# Patient Record
Sex: FEMALE | Race: WHITE | Employment: OTHER | URBAN - METROPOLITAN AREA
[De-identification: names, ages, dates, MRNs, and addresses within clinical notes are randomized per-mention and may not be internally consistent; named-entity substitution may affect disease eponyms.]

---

## 2016-05-16 LAB
HEMOCCULT STL QL: NEGATIVE
HEMOCCULT STL QL: NORMAL
HEMOCCULT STL QL: NORMAL

## 2017-06-23 ENCOUNTER — TELEPHONE (OUTPATIENT)
Dept: FAMILY MEDICINE CLINIC | Age: 62
End: 2017-06-23

## 2017-06-23 DIAGNOSIS — E78.00 HYPERCHOLESTEREMIA: Primary | ICD-10-CM

## 2017-06-23 DIAGNOSIS — E55.9 VITAMIN D DEFICIENCY: ICD-10-CM

## 2017-06-23 DIAGNOSIS — E87.6 LOW SERUM POTASSIUM: ICD-10-CM

## 2017-07-11 LAB
A/G RATIO: 1.7 (CALC) (ref 1–2.5)
ALBUMIN SERPL-MCNC: 4.4 G/DL
ALBUMIN SERPL-MCNC: 4.4 G/DL (ref 3.6–5.1)
ALP BLD-CCNC: 84 U/L
ALP BLD-CCNC: 84 U/L (ref 33–130)
ALT SERPL-CCNC: 13 U/L
ALT SERPL-CCNC: 13 U/L (ref 6–29)
AST SERPL-CCNC: 16 U/L
AST SERPL-CCNC: 16 U/L (ref 10–35)
BILIRUB SERPL-MCNC: 0.7 MG/DL (ref 0.1–1.4)
BILIRUB SERPL-MCNC: 0.7 MG/DL (ref 0.2–1.2)
BUN / CREAT RATIO: NORMAL (CALC) (ref 6–22)
BUN BLDV-MCNC: 15 MG/DL
BUN BLDV-MCNC: 15 MG/DL (ref 7–25)
CALCIUM SERPL-MCNC: 9.4 MG/DL
CALCIUM SERPL-MCNC: 9.4 MG/DL (ref 8.6–10.4)
CHLORIDE BLD-SCNC: 105 MMOL/L
CHLORIDE BLD-SCNC: 105 MMOL/L (ref 98–110)
CHOLESTEROL, TOTAL: 248 MG/DL
CHOLESTEROL, TOTAL: 248 MG/DL (ref 125–200)
CHOLESTEROL/HDL RATIO: 4.7
CHOLESTEROL/HDL RATIO: 4.7 (CALC)
CHOLESTEROL: 195 MG/DL (CALC)
CO2: 26 MMOL/L
CO2: 26 MMOL/L (ref 20–31)
COPY TO: NORMAL
CREAT SERPL-MCNC: 0.71 MG/DL
CREAT SERPL-MCNC: 0.71 MG/DL (ref 0.5–0.99)
GFR AFRICAN AMERICAN: 107 ML/MIN/1.73M2
GFR CALCULATED: NORMAL
GFR SERPL CREATININE-BSD FRML MDRD: 93 ML/MIN/1.73M2
GLOBULIN: 2.6 G/DL (CALC) (ref 1.9–3.7)
GLUCOSE BLD-MCNC: 81 MG/DL
GLUCOSE BLD-MCNC: 81 MG/DL (ref 65–99)
HDLC SERPL-MCNC: 53 MG/DL
HDLC SERPL-MCNC: 53 MG/DL (ref 35–70)
LDL CHOLESTEROL CALCULATED: 159 MG/DL (CALC)
LDL CHOLESTEROL CALCULATED: 159 MG/DL (ref 0–160)
POTASSIUM SERPL-SCNC: 3.9 MMOL/L
POTASSIUM SERPL-SCNC: 3.9 MMOL/L (ref 3.5–5.3)
SODIUM BLD-SCNC: 140 MMOL/L
SODIUM BLD-SCNC: 140 MMOL/L (ref 135–146)
TOTAL PROTEIN: 7
TOTAL PROTEIN: 7 G/DL (ref 6.1–8.1)
TRIGL SERPL-MCNC: 178 MG/DL
TRIGL SERPL-MCNC: 178 MG/DL
VITAMIN D 25-HYDROXY: 54
VITAMIN D 25-HYDROXY: 54 NG/ML (ref 30–100)
VITAMIN D2, 25 HYDROXY: NORMAL
VITAMIN D3,25 HYDROXY: NORMAL
VLDLC SERPL CALC-MCNC: ABNORMAL MG/DL

## 2017-07-19 ENCOUNTER — OFFICE VISIT (OUTPATIENT)
Dept: FAMILY MEDICINE CLINIC | Age: 62
End: 2017-07-19

## 2017-07-19 VITALS
HEIGHT: 63 IN | BODY MASS INDEX: 26.97 KG/M2 | DIASTOLIC BLOOD PRESSURE: 60 MMHG | RESPIRATION RATE: 16 BRPM | HEART RATE: 78 BPM | SYSTOLIC BLOOD PRESSURE: 110 MMHG | OXYGEN SATURATION: 98 % | WEIGHT: 152.2 LBS

## 2017-07-19 DIAGNOSIS — E78.5 HYPERLIPIDEMIA, UNSPECIFIED HYPERLIPIDEMIA TYPE: ICD-10-CM

## 2017-07-19 DIAGNOSIS — F41.9 ANXIETY: ICD-10-CM

## 2017-07-19 DIAGNOSIS — Z00.00 ANNUAL PHYSICAL EXAM: Primary | ICD-10-CM

## 2017-07-19 DIAGNOSIS — Z12.39 BREAST CANCER SCREENING: ICD-10-CM

## 2017-07-19 PROCEDURE — 99396 PREV VISIT EST AGE 40-64: CPT | Performed by: NURSE PRACTITIONER

## 2017-07-19 RX ORDER — LORAZEPAM 0.5 MG/1
0.5 TABLET ORAL 2 TIMES DAILY PRN
Qty: 60 TABLET | Refills: 0 | Status: SHIPPED | OUTPATIENT
Start: 2017-07-19 | End: 2017-08-18

## 2017-07-19 RX ORDER — UBIDECARENONE 75 MG
50 CAPSULE ORAL DAILY
COMMUNITY
End: 2018-11-29

## 2017-07-19 RX ORDER — ATORVASTATIN CALCIUM 10 MG/1
10 TABLET, FILM COATED ORAL DAILY
Qty: 30 TABLET | Refills: 2 | Status: SHIPPED | OUTPATIENT
Start: 2017-07-19 | End: 2017-10-10 | Stop reason: SDUPTHER

## 2017-07-19 ASSESSMENT — PATIENT HEALTH QUESTIONNAIRE - PHQ9
SUM OF ALL RESPONSES TO PHQ QUESTIONS 1-9: 0
1. LITTLE INTEREST OR PLEASURE IN DOING THINGS: 0
2. FEELING DOWN, DEPRESSED OR HOPELESS: 0
SUM OF ALL RESPONSES TO PHQ9 QUESTIONS 1 & 2: 0

## 2017-10-10 ENCOUNTER — PATIENT MESSAGE (OUTPATIENT)
Dept: FAMILY MEDICINE CLINIC | Age: 62
End: 2017-10-10

## 2017-10-10 DIAGNOSIS — E78.5 HYPERLIPIDEMIA, UNSPECIFIED HYPERLIPIDEMIA TYPE: ICD-10-CM

## 2017-10-10 DIAGNOSIS — Z79.899 LONG TERM USE OF DRUG: Primary | ICD-10-CM

## 2017-10-10 RX ORDER — ATORVASTATIN CALCIUM 10 MG/1
10 TABLET, FILM COATED ORAL DAILY
Qty: 30 TABLET | Refills: 2 | Status: SHIPPED | OUTPATIENT
Start: 2017-10-10 | End: 2018-01-19 | Stop reason: SDUPTHER

## 2017-10-10 RX ORDER — SCOLOPAMINE TRANSDERMAL SYSTEM 1 MG/1
1 PATCH, EXTENDED RELEASE TRANSDERMAL
Qty: 4 PATCH | Refills: 0 | Status: SHIPPED | OUTPATIENT
Start: 2017-10-10 | End: 2018-09-14 | Stop reason: ALTCHOICE

## 2017-11-20 ENCOUNTER — OFFICE VISIT (OUTPATIENT)
Dept: FAMILY MEDICINE CLINIC | Age: 62
End: 2017-11-20

## 2017-11-20 VITALS
WEIGHT: 150 LBS | RESPIRATION RATE: 16 BRPM | DIASTOLIC BLOOD PRESSURE: 68 MMHG | HEART RATE: 99 BPM | TEMPERATURE: 101 F | HEIGHT: 63 IN | BODY MASS INDEX: 26.58 KG/M2 | SYSTOLIC BLOOD PRESSURE: 112 MMHG | OXYGEN SATURATION: 97 %

## 2017-11-20 DIAGNOSIS — J06.9 VIRAL URI: Primary | ICD-10-CM

## 2017-11-20 DIAGNOSIS — R52 BODY ACHES: ICD-10-CM

## 2017-11-20 DIAGNOSIS — R50.9 TEMPERATURE ELEVATED: ICD-10-CM

## 2017-11-20 LAB
INFLUENZA A ANTIBODY: NEGATIVE
INFLUENZA B ANTIBODY: NEGATIVE

## 2017-11-20 PROCEDURE — 87804 INFLUENZA ASSAY W/OPTIC: CPT | Performed by: NURSE PRACTITIONER

## 2017-11-20 PROCEDURE — 99214 OFFICE O/P EST MOD 30 MIN: CPT | Performed by: NURSE PRACTITIONER

## 2017-11-20 RX ORDER — AMOXICILLIN 500 MG/1
1 TABLET, FILM COATED ORAL 3 TIMES DAILY
Qty: 30 TABLET | Refills: 0 | Status: SHIPPED | OUTPATIENT
Start: 2017-11-20 | End: 2017-11-30

## 2017-11-20 RX ORDER — AMOXICILLIN 250 MG/1
500 CAPSULE ORAL 2 TIMES DAILY
Qty: 20 CAPSULE | Refills: 0 | Status: SHIPPED | OUTPATIENT
Start: 2017-11-20 | End: 2017-11-30

## 2017-11-20 ASSESSMENT — ENCOUNTER SYMPTOMS
CHEST TIGHTNESS: 0
COUGH: 1
WHEEZING: 0

## 2017-11-20 NOTE — PATIENT INSTRUCTIONS
avoid nausea. Avoid Ibuprofen if you have high blood pressure, CHF, or kidney problems. 6.Gargle: Gargle in the back of the throat with the head tilted back and to the sides with a strong mouthwash ( Listerine or Scope) after meals and at bedtime at least 4 -5 times a day. This helps kill bacteria and viruses in the back of the throat and will shorten the duration and decrease the severity of your symptoms: sore throat, cough, ear popping,/ear pain, and possibly dizziness. 7. Smoking: Avoid smoking or exposure to second hand smoke. 8. Zinc: Zinc lozenges such as Cold Carroll, or Basic will help shorten the duration and lessen symptoms such as sore throat, cough, nasal congestion, runny nose, and post nasal drip. Use 1 lozenge every 2-4 hours ( after meals if stomach is sensitive). Children can use 10-15 mg. Or less 3-4 times a day or Zinc lollypops. In pregnancy limit to 50-60 mg. A day for 7 days as prenatals have Zinc also. With diarrhea use zinc pills 50 mg 1/2 to 1 pill 2x/day. 9. Vitamins: Vitamin C 500 mg. With breakfast and dinner. Children and pregnant women should drink citrus juices. This speeds healing and strengthens immune system. 10. Chest Symptoms: Vicks Vapor rub to the chest at bedtime. 11. Decongestants: Avoid all decongestants and antihistamine cold preparations in children. Try all of the above starting with day 1 of symptoms. If Strep throat symptoms appear call to be seen in the office as soon as possible and don't gargle on that day. Newborns, infants, or anyone with earaches or influenza may need to be seen quickly. Adults with fevers over 103 degrees or shortness of breath should call the office immediately. - doxycycline (ADOXA) 100 MG tablet; Take 1 tablet by mouth 2 times daily for 7 days. Use if not getting better. Tobacco abuse   Cut back as much as you can.

## 2017-11-20 NOTE — PROGRESS NOTES
Take 1 tablet by mouth 2 x        daily for 10 days- pt instructed to wait 3 days until taking medication - if symptoms persist/worsen    Body aches  Temperature elevated  -     POCT Influenza A/B- Negative

## 2017-12-19 ENCOUNTER — PATIENT MESSAGE (OUTPATIENT)
Dept: FAMILY MEDICINE CLINIC | Age: 62
End: 2017-12-19

## 2017-12-20 NOTE — TELEPHONE ENCOUNTER
From: Markel Cobian  To: Ciarra Lewis MD  Sent: 12/19/2017 10:10 PM EST  Subject: Non-Urgent Medical Question    Please update my records. I did already receive a flu shot this year through the [facility]. I had a mammogram this year and the results were sent to my [facility]. I get mammograms on a yearly basis. And have been tested before giving a blood donation. I do not wish to make appointments for these items.     Thank you

## 2018-01-19 DIAGNOSIS — E78.5 HYPERLIPIDEMIA, UNSPECIFIED HYPERLIPIDEMIA TYPE: ICD-10-CM

## 2018-01-19 RX ORDER — ATORVASTATIN CALCIUM 10 MG/1
10 TABLET, FILM COATED ORAL DAILY
Qty: 30 TABLET | Refills: 10 | Status: SHIPPED | OUTPATIENT
Start: 2018-01-19 | End: 2018-09-17 | Stop reason: SDUPTHER

## 2018-09-14 ENCOUNTER — OFFICE VISIT (OUTPATIENT)
Dept: FAMILY MEDICINE CLINIC | Age: 63
End: 2018-09-14

## 2018-09-14 VITALS
OXYGEN SATURATION: 98 % | BODY MASS INDEX: 25.2 KG/M2 | SYSTOLIC BLOOD PRESSURE: 122 MMHG | WEIGHT: 142.2 LBS | HEART RATE: 80 BPM | DIASTOLIC BLOOD PRESSURE: 82 MMHG | HEIGHT: 63 IN | RESPIRATION RATE: 18 BRPM | TEMPERATURE: 98.7 F

## 2018-09-14 DIAGNOSIS — M79.605 LEFT LEG PAIN: ICD-10-CM

## 2018-09-14 DIAGNOSIS — M25.552 HIP PAIN, ACUTE, LEFT: Primary | ICD-10-CM

## 2018-09-14 DIAGNOSIS — Z23 NEED FOR INFLUENZA VACCINATION: ICD-10-CM

## 2018-09-14 PROCEDURE — 90688 IIV4 VACCINE SPLT 0.5 ML IM: CPT | Performed by: NURSE PRACTITIONER

## 2018-09-14 PROCEDURE — 90471 IMMUNIZATION ADMIN: CPT | Performed by: NURSE PRACTITIONER

## 2018-09-14 PROCEDURE — 99213 OFFICE O/P EST LOW 20 MIN: CPT | Performed by: NURSE PRACTITIONER

## 2018-09-14 RX ORDER — METHOCARBAMOL 500 MG/1
500 TABLET, FILM COATED ORAL 4 TIMES DAILY PRN
Qty: 30 TABLET | Refills: 0 | Status: SHIPPED | OUTPATIENT
Start: 2018-09-14 | End: 2018-09-24

## 2018-09-14 ASSESSMENT — ENCOUNTER SYMPTOMS: BACK PAIN: 0

## 2018-09-14 ASSESSMENT — PATIENT HEALTH QUESTIONNAIRE - PHQ9
SUM OF ALL RESPONSES TO PHQ QUESTIONS 1-9: 0
1. LITTLE INTEREST OR PLEASURE IN DOING THINGS: 0
2. FEELING DOWN, DEPRESSED OR HOPELESS: 0
SUM OF ALL RESPONSES TO PHQ QUESTIONS 1-9: 0
SUM OF ALL RESPONSES TO PHQ9 QUESTIONS 1 & 2: 0

## 2018-09-14 NOTE — PATIENT INSTRUCTIONS
shortness of breath, or you cough up blood.     · Your leg is cool or pale or changes color.    Call your doctor now or seek immediate medical care if:    · You have increasing or severe pain.     · Your leg suddenly feels weak and you cannot move it.     · You have signs of a blood clot, such as:  ¨ Pain in your calf, back of the knee, thigh, or groin. ¨ Redness and swelling in your leg or groin.     · You have signs of infection, such as:  ¨ Increased pain, swelling, warmth, or redness. ¨ Red streaks leading from the sore area. ¨ Pus draining from a place on your leg. ¨ A fever.     · You cannot bear weight on your leg.    Watch closely for changes in your health, and be sure to contact your doctor if:    · You do not get better as expected. Where can you learn more? Go to https://[email or web address].SoleTrader.com. org and sign in to your [employer] account. Enter W581 in the [employer] box to learn more about \"Leg Pain: Care Instructions. \"     If you do not have an account, please click on the \"Sign Up Now\" link. Current as of: November 20, 2017  Content Version: 11.7  © 0217-2107 [employer], [employer]. Care instructions adapted under license by [facility] ([facility]). If you have questions about a medical condition or this instruction, always ask your healthcare professional. [place] [address] any warranty or liability for your use of this information.

## 2018-09-17 DIAGNOSIS — E78.5 HYPERLIPIDEMIA, UNSPECIFIED HYPERLIPIDEMIA TYPE: ICD-10-CM

## 2018-09-18 RX ORDER — ATORVASTATIN CALCIUM 10 MG/1
TABLET, FILM COATED ORAL
Qty: 90 TABLET | Refills: 0 | Status: SHIPPED | OUTPATIENT
Start: 2018-09-18 | End: 2018-12-24 | Stop reason: SDUPTHER

## 2018-11-29 ENCOUNTER — OFFICE VISIT (OUTPATIENT)
Dept: FAMILY MEDICINE CLINIC | Age: 63
End: 2018-11-29
Payer: COMMERCIAL

## 2018-11-29 VITALS
HEIGHT: 63 IN | DIASTOLIC BLOOD PRESSURE: 90 MMHG | BODY MASS INDEX: 25.69 KG/M2 | HEART RATE: 80 BPM | RESPIRATION RATE: 16 BRPM | WEIGHT: 145 LBS | SYSTOLIC BLOOD PRESSURE: 138 MMHG

## 2018-11-29 DIAGNOSIS — K21.9 GASTROESOPHAGEAL REFLUX DISEASE, ESOPHAGITIS PRESENCE NOT SPECIFIED: Primary | ICD-10-CM

## 2018-11-29 DIAGNOSIS — Z00.00 WELLNESS EXAMINATION: ICD-10-CM

## 2018-11-29 DIAGNOSIS — R03.0 ELEVATED BP WITHOUT DIAGNOSIS OF HYPERTENSION: ICD-10-CM

## 2018-11-29 PROCEDURE — 99214 OFFICE O/P EST MOD 30 MIN: CPT | Performed by: REGISTERED NURSE

## 2018-11-29 ASSESSMENT — ENCOUNTER SYMPTOMS
SHORTNESS OF BREATH: 0
DIARRHEA: 0
CONSTIPATION: 0
BLOOD IN STOOL: 0
COUGH: 0
VOMITING: 0
RECTAL PAIN: 0
ABDOMINAL PAIN: 1
ANAL BLEEDING: 0
ABDOMINAL DISTENTION: 0
NAUSEA: 0
WHEEZING: 0
CHEST TIGHTNESS: 0

## 2018-11-29 NOTE — PATIENT INSTRUCTIONS
systolic pressure. It shows how hard the blood pushes when your heart is pumping. The second number is the diastolic pressure. It shows how hard the blood pushes between heartbeats, when your heart is relaxed and filling with blood. An ideal blood pressure in adults is less than 120/80 (say \"120 over 80\"). High blood pressure is 140/90 or higher. You have high blood pressure if your top number is 140 or higher or your bottom number is 90 or higher, or both. The main test for high blood pressure is simple, fast, and painless. To diagnose high blood pressure, your doctor will test your blood pressure at different times. After testing your blood pressure, your doctor may ask you to test it again when you are home. If you are diagnosed with high blood pressure, you can work with your doctor to make a long-term plan to manage it. Follow-up care is a key part of your treatment and safety. Be sure to make and go to all appointments, and call your doctor if you are having problems. It's also a good idea to know your test results and keep a list of the medicines you take. How can you care for yourself at home? · Do not smoke. Smoking increases your risk for heart attack and stroke. If you need help quitting, talk to your doctor about stop-smoking programs and medicines. These can increase your chances of quitting for good. · Stay at a healthy weight. · Try to limit how much sodium you eat to less than 2,300 milligrams (mg) a day. Your doctor may ask you to try to eat less than 1,500 mg a day. · Be physically active. Get at least 30 minutes of exercise on most days of the week. Walking is a good choice. You also may want to do other activities, such as running, swimming, cycling, or playing tennis or team sports. · Avoid or limit alcohol. Talk to your doctor about whether you can drink any alcohol. · Eat plenty of fruits, vegetables, and low-fat dairy products. Eat less saturated and total fats.   · Learn how to check your blood pressure at home. When should you call for help? Call your doctor now or seek immediate medical care if:    · Your blood pressure is much higher than normal (such as 180/110 or higher).     · You think high blood pressure is causing symptoms such as:  ¨ Severe headache. ¨ Blurry vision.    Watch closely for changes in your health, and be sure to contact your doctor if:    · You do not get better as expected. Where can you learn more? Go to https://[employer]. org and sign in to your [employer] account. Enter I332 in the [employer] box to learn more about \"Elevated Blood Pressure: Care Instructions. \"     If you do not have an account, please click on the \"Sign Up Now\" link. Current as of: May 10, 2017  Content Version: 11.6  © 5424-8348 [employer]. Care instructions adapted under license by [facility] ([facility]). If you have questions about a medical condition or this instruction, always ask your healthcare professional. [address]  any warranty or liability for your use of this information.

## 2018-11-29 NOTE — PROGRESS NOTES
Sister        Current Outpatient Prescriptions   Medication Sig Dispense Refill    Diclofenac Sodium  MG TB24 TAKE 1 TABLET BY MOUTH ONE TIME A DAY  0    atorvastatin (LIPITOR) 10 MG tablet TAKE 1 TABLET BY MOUTH ONE TIME A DAY  90 tablet 0    Multiple Vitamins-Minerals (THERAPEUTIC MULTIVITAMIN-MINERALS) tablet Take 1 tablet by mouth daily      Ascorbic Acid (VITAMIN C) 500 MG tablet Take 500 mg by mouth daily      Cholecalciferol (VITAMIN D3) 3000 UNITS TABS Take by mouth      Omega-3 Fatty Acids (FISH OIL PO) Take by mouth      FIBER PO Take by mouth      Calcium-Phosphorus-Vitamin D (CALCIUM/VITAMIN D3/ADULT GUMMY PO) Take 1 each by mouth daily      vitamin B-12 (CYANOCOBALAMIN) 100 MCG tablet Take 50 mcg by mouth daily       No current facility-administered medications for this visit. Allergies   Allergen Reactions    Ancef [Cefazolin]     Codeine     Cyclobenzaprine     Erythromycin     Sulfa Antibiotics     Sulfamethazine        Review of Systems   Constitutional: Negative for chills, diaphoresis, fatigue and fever. Respiratory: Negative for cough, chest tightness, shortness of breath and wheezing. Cardiovascular: Negative for chest pain and palpitations. Gastrointestinal: Positive for abdominal pain. Negative for abdominal distention, anal bleeding, blood in stool, constipation, diarrhea, nausea, rectal pain and vomiting. Epigastric pain and belching   Neurological: Negative for dizziness, weakness, light-headedness, numbness and headaches. All other systems reviewed and are negative. Vitals:  BP (!) 150/90 (Site: Right Upper Arm, Position: Sitting, Cuff Size: Medium Adult)   Pulse 80   Resp 16   Ht 5' 3\" (1.6 m)   Wt 145 lb (65.8 kg)   LMP  (Exact Date)   Breastfeeding? No   BMI 25.69 kg/m²     Physical Exam   Constitutional: She is oriented to person, place, and time. She appears well-developed and well-nourished.    Cardiovascular: Normal rate,

## 2018-12-23 DIAGNOSIS — E78.5 HYPERLIPIDEMIA, UNSPECIFIED HYPERLIPIDEMIA TYPE: ICD-10-CM

## 2018-12-24 RX ORDER — ATORVASTATIN CALCIUM 10 MG/1
TABLET, FILM COATED ORAL
Qty: 30 TABLET | Refills: 9 | Status: SHIPPED | OUTPATIENT
Start: 2018-12-24 | End: 2019-02-20 | Stop reason: SDUPTHER

## 2018-12-27 LAB
ALBUMIN SERPL-MCNC: 4 G/DL
ALP BLD-CCNC: 81 U/L
ALT SERPL-CCNC: 29 U/L
ANION GAP SERPL CALCULATED.3IONS-SCNC: NORMAL MMOL/L
AST SERPL-CCNC: 23 U/L
BILIRUB SERPL-MCNC: 0.6 MG/DL (ref 0.1–1.4)
BUN BLDV-MCNC: 15 MG/DL
CALCIUM SERPL-MCNC: 9.1 MG/DL
CHLORIDE BLD-SCNC: 108 MMOL/L
CHOLESTEROL, TOTAL: 174 MG/DL
CHOLESTEROL/HDL RATIO: NORMAL
CO2: 27 MMOL/L
CREAT SERPL-MCNC: 0.76 MG/DL
GFR CALCULATED: NORMAL
GLUCOSE BLD-MCNC: 89 MG/DL
HDLC SERPL-MCNC: 53 MG/DL (ref 35–70)
LDL CHOLESTEROL CALCULATED: 99 MG/DL (ref 0–160)
POTASSIUM SERPL-SCNC: 3.9 MMOL/L
SODIUM BLD-SCNC: 141 MMOL/L
TOTAL PROTEIN: 6.7
TRIGL SERPL-MCNC: 123 MG/DL
VLDLC SERPL CALC-MCNC: NORMAL MG/DL

## 2019-02-20 ENCOUNTER — OFFICE VISIT (OUTPATIENT)
Dept: FAMILY MEDICINE CLINIC | Age: 64
End: 2019-02-20
Payer: COMMERCIAL

## 2019-02-20 VITALS
TEMPERATURE: 97.8 F | OXYGEN SATURATION: 97 % | SYSTOLIC BLOOD PRESSURE: 116 MMHG | HEIGHT: 63 IN | WEIGHT: 147.8 LBS | HEART RATE: 77 BPM | DIASTOLIC BLOOD PRESSURE: 72 MMHG | BODY MASS INDEX: 26.19 KG/M2

## 2019-02-20 DIAGNOSIS — Z11.4 ENCOUNTER FOR SCREENING FOR HIV: ICD-10-CM

## 2019-02-20 DIAGNOSIS — E78.5 HYPERLIPIDEMIA, UNSPECIFIED HYPERLIPIDEMIA TYPE: ICD-10-CM

## 2019-02-20 DIAGNOSIS — Z11.59 ENCOUNTER FOR HEPATITIS C SCREENING TEST FOR LOW RISK PATIENT: ICD-10-CM

## 2019-02-20 DIAGNOSIS — Z00.00 ANNUAL PHYSICAL EXAM: ICD-10-CM

## 2019-02-20 PROCEDURE — 99396 PREV VISIT EST AGE 40-64: CPT | Performed by: NURSE PRACTITIONER

## 2019-02-20 RX ORDER — CLOTRIMAZOLE 1 %
CREAM (GRAM) TOPICAL 2 TIMES DAILY
COMMUNITY
End: 2019-08-26

## 2019-02-20 RX ORDER — DESONIDE 0.5 MG/G
CREAM TOPICAL
Refills: 2 | COMMUNITY
Start: 2019-02-12 | End: 2019-08-26

## 2019-02-20 RX ORDER — ATORVASTATIN CALCIUM 10 MG/1
TABLET, FILM COATED ORAL
Qty: 30 TABLET | Refills: 11 | Status: SHIPPED | OUTPATIENT
Start: 2019-02-20 | End: 2019-08-26 | Stop reason: SDUPTHER

## 2019-02-20 ASSESSMENT — PATIENT HEALTH QUESTIONNAIRE - PHQ9
1. LITTLE INTEREST OR PLEASURE IN DOING THINGS: 0
SUM OF ALL RESPONSES TO PHQ QUESTIONS 1-9: 0
SUM OF ALL RESPONSES TO PHQ QUESTIONS 1-9: 0
SUM OF ALL RESPONSES TO PHQ9 QUESTIONS 1 & 2: 0
2. FEELING DOWN, DEPRESSED OR HOPELESS: 0

## 2019-03-08 ENCOUNTER — PATIENT MESSAGE (OUTPATIENT)
Dept: FAMILY MEDICINE CLINIC | Age: 64
End: 2019-03-08

## 2019-03-08 ENCOUNTER — E-VISIT (OUTPATIENT)
Dept: FAMILY MEDICINE CLINIC | Age: 64
End: 2019-03-08
Payer: COMMERCIAL

## 2019-03-08 DIAGNOSIS — J01.90 ACUTE SINUSITIS, RECURRENCE NOT SPECIFIED, UNSPECIFIED LOCATION: Primary | ICD-10-CM

## 2019-03-08 PROCEDURE — 99444 PR PHYSICIAN ONLINE EVALUATION & MANAGEMENT SERVICE: CPT | Performed by: FAMILY MEDICINE

## 2019-03-08 RX ORDER — AMOXICILLIN 500 MG/1
500 CAPSULE ORAL 3 TIMES DAILY
Qty: 21 CAPSULE | Refills: 0 | Status: SHIPPED | OUTPATIENT
Start: 2019-03-08 | End: 2019-03-15

## 2019-03-08 ASSESSMENT — LIFESTYLE VARIABLES: SMOKING_STATUS: NO, I'VE NEVER SMOKED

## 2019-08-26 ENCOUNTER — OFFICE VISIT (OUTPATIENT)
Dept: FAMILY MEDICINE CLINIC | Age: 64
End: 2019-08-26
Payer: COMMERCIAL

## 2019-08-26 VITALS
WEIGHT: 142 LBS | SYSTOLIC BLOOD PRESSURE: 116 MMHG | HEART RATE: 69 BPM | BODY MASS INDEX: 25.16 KG/M2 | HEIGHT: 63 IN | OXYGEN SATURATION: 98 % | DIASTOLIC BLOOD PRESSURE: 80 MMHG | RESPIRATION RATE: 16 BRPM

## 2019-08-26 DIAGNOSIS — R10.11 RUQ PAIN: Primary | ICD-10-CM

## 2019-08-26 DIAGNOSIS — E78.5 HYPERLIPIDEMIA, UNSPECIFIED HYPERLIPIDEMIA TYPE: ICD-10-CM

## 2019-08-26 LAB
ABSOLUTE BANDS #: NORMAL
ABSOLUTE EOS #: 57
ABSOLUTE IMMATURE GRANULOCYTE: NORMAL
ABSOLUTE LYMPH #: 1783
ABSOLUTE MONO #: 498
ALBUMIN SERPL-MCNC: 4.4 G/DL
ALP BLD-CCNC: 86 U/L
ALT SERPL-CCNC: 16 U/L
AMYLASE: 58 UNITS/L
ANION GAP SERPL CALCULATED.3IONS-SCNC: NORMAL MMOL/L
AST SERPL-CCNC: 16 U/L
BANDS: NORMAL
BASOPHILS # BLD: 0.6 10*3/UL
BASOPHILS ABSOLUTE: 38 /ΜL
BILIRUB SERPL-MCNC: 0.5 MG/DL (ref 0.1–1.4)
BILIRUBIN, POC: ABNORMAL
BLOOD URINE, POC: ABNORMAL
BUN BLDV-MCNC: 10 MG/DL
C-REACTIVE PROTEIN: 4.2
CALCIUM SERPL-MCNC: 9.2 MG/DL
CHLORIDE BLD-SCNC: 106 MMOL/L
CLARITY, POC: ABNORMAL
CO2: 27 MMOL/L
COLOR, POC: ABNORMAL
CREAT SERPL-MCNC: 0.68 MG/DL
EOSINOPHILS RELATIVE PERCENT: 0.9
GFR CALCULATED: 94
GLUCOSE BLD-MCNC: 87 MG/DL
GLUCOSE URINE, POC: ABNORMAL
HCT VFR BLD CALC: 41.5 % (ref 36–46)
HEMOGLOBIN: 14.2 G/DL (ref 12–16)
IMMATURE GRANULOCYTES: NORMAL
KETONES, POC: ABNORMAL
LEUKOCYTE EST, POC: ABNORMAL
LIPASE: 20 UNITS/L
LYMPHOCYTES # BLD: 28.3 10*3/UL
MCH RBC QN AUTO: 31.2 PG
MCHC RBC AUTO-ENTMCNC: 34.2 G/DL
MCV RBC AUTO: 91.2 FL
MONOCYTES # BLD: 7.9 10*3/UL
MORPHOLOGY: NORMAL
NITRITE, POC: ABNORMAL
NRBC AUTOMATED: NORMAL
PDW BLD-RTO: 12.8 %
PH, POC: 5.5
PLATELET # BLD: 211 K/ΜL
PLATELET ESTIMATE: NORMAL
PMV BLD AUTO: 11.5 FL
POTASSIUM SERPL-SCNC: 4 MMOL/L
PROTEIN, POC: ABNORMAL
RBC # BLD: 4.55 10^6/ΜL
RBC # BLD: NORMAL 10*6/UL
SEDIMENTATION RATE, ERYTHROCYTE: 2
SEG NEUTROPHILS: 62.3
SEGMENTED NEUTROPHILS ABSOLUTE COUNT: 3925
SODIUM BLD-SCNC: 142 MMOL/L
SPECIFIC GRAVITY, POC: 1.03
TOTAL PROTEIN: 6.8
UROBILINOGEN, POC: 0.2
WBC # BLD: 6.3 10^3/ML
WBC # BLD: NORMAL 10*3/UL

## 2019-08-26 PROCEDURE — 99214 OFFICE O/P EST MOD 30 MIN: CPT | Performed by: REGISTERED NURSE

## 2019-08-26 PROCEDURE — 81002 URINALYSIS NONAUTO W/O SCOPE: CPT | Performed by: REGISTERED NURSE

## 2019-08-26 RX ORDER — ATORVASTATIN CALCIUM 10 MG/1
TABLET, FILM COATED ORAL
Qty: 30 TABLET | Refills: 5 | Status: SHIPPED | OUTPATIENT
Start: 2019-08-26 | End: 2020-03-09 | Stop reason: SDUPTHER

## 2019-08-26 ASSESSMENT — ENCOUNTER SYMPTOMS
CONSTIPATION: 0
ABDOMINAL PAIN: 1
CHEST TIGHTNESS: 0
ABDOMINAL DISTENTION: 0
COUGH: 0
VOMITING: 0
RECTAL PAIN: 0
ANAL BLEEDING: 0
SHORTNESS OF BREATH: 0
WHEEZING: 0
NAUSEA: 1
DIARRHEA: 0
BLOOD IN STOOL: 0

## 2019-08-26 NOTE — PATIENT INSTRUCTIONS
Patient Education        Abdominal Pain: Care Instructions  Your Care Instructions    Abdominal pain has many possible causes. Some aren't serious and get better on their own in a few days. Others need more testing and treatment. If your pain continues or gets worse, you need to be rechecked and may need more tests to find out what is wrong. You may need surgery to correct the problem. Don't ignore new symptoms, such as fever, nausea and vomiting, urination problems, pain that gets worse, and dizziness. These may be signs of a more serious problem. Your doctor may have recommended a follow-up visit in the next 8 to 12 hours. If you are not getting better, you may need more tests or treatment. The doctor has checked you carefully, but problems can develop later. If you notice any problems or new symptoms, get medical treatment right away. Follow-up care is a key part of your treatment and safety. Be sure to make and go to all appointments, and call your doctor if you are having problems. It's also a good idea to know your test results and keep a list of the medicines you take. How can you care for yourself at home? · Rest until you feel better. · To prevent dehydration, drink plenty of fluids, enough so that your urine is light yellow or clear like water. Choose water and other caffeine-free clear liquids until you feel better. If you have kidney, heart, or liver disease and have to limit fluids, talk with your doctor before you increase the amount of fluids you drink. · If your stomach is upset, eat mild foods, such as rice, dry toast or crackers, bananas, and applesauce. Try eating several small meals instead of two or three large ones. · Wait until 48 hours after all symptoms have gone away before you have spicy foods, alcohol, and drinks that contain caffeine. · Do not eat foods that are high in fat. · Avoid anti-inflammatory medicines such as aspirin, ibuprofen (Advil, Motrin), and naproxen (Aleve). 1975-8869 Healthwise, Incorporated. Care instructions adapted under license by [facility] ([facility]). If you have questions about a medical condition or this instruction, always ask your healthcare professional. [address] any warranty or liability for your use of this information. Patient Education        Biliary Colic: Care Instructions  Your Care Instructions    Biliary (say \"BILL-ee-air-ee\") colic is belly pain caused by gallbladder problems. It is usually caused by a gallstone moving through or blocking the common bile duct or cystic duct. Gallstones are stones that form in the gallbladder. They are made of cholesterol and other substances. The gallbladder is a small sac located just under the liver. It stores bile released by the liver. Bile helps you digest fats. Gallstones also can form in the common bile duct or cystic duct. These ducts carry bile from the gallbladder and the liver to the small intestine. Gallstones may be as small as a grain of sand or as large as a golf ball. Gallstones that cause severe symptoms usually are treated with surgery to remove the gallbladder. If the first attack of biliary colic is mild, it is often safe to wait until you have had another attack before you think about having surgery. The doctor has checked you carefully, but problems can develop later. If you notice any problems or new symptoms, get medical treatment right away. Follow-up care is a key part of your treatment and safety. Be sure to make and go to all appointments, and call your doctor if you are having problems. It's also a good idea to know your test results and keep a list of the medicines you take. How can you care for yourself at home? · Take pain medicines exactly as directed. ? If the doctor gave you a prescription medicine for pain, take it as prescribed. ? If you are not taking a prescription pain medicine, ask your doctor if you can take an over-the-counter medicine. them in butter or fat. · Drink or eat nonfat or low-fat milk, yogurt, cheese, or other milk products each day. ? Read the labels on cheeses, and choose those with less than 5 grams of fat an ounce. ? Try fat-free sour cream, cream cheese, or yogurt. ? Avoid cream soups and cream sauces on pasta. ? Eat low-fat ice cream, frozen yogurt, or sorbet. Avoid regular ice cream.  · Eat whole-grain cereals, breads, crackers, rice, or pasta. Avoid high-fat foods such as croissants, scones, biscuits, waffles, doughnuts, muffins, granola, and high-fat breads. · Flavor your foods with herbs and spices (such as basil, tarragon, or mint), fat-free sauces, or lemon juice instead of butter. You can also use butter substitutes, fat-free mayonnaise, or fat-free dressing. · Try applesauce, prune puree, or mashed bananas to replace some or all of the fat when you bake. · Limit fats and oils, such as butter, margarine, mayonnaise, and salad dressing, to no more than 1 tablespoon a meal.  · Avoid high-fat foods, such as:  ? Chocolate, whole milk, ice cream, and processed cheese. ? Fried or buttered foods. ? Sausage, salami, and leiva. ? Cinnamon rolls, cakes, pies, cookies, and other pastries. ? Prepared snack foods, such as potato chips, nut and granola bars, and mixed nuts. ? Coconut and avocado. · Learn how to read food labels for serving sizes and ingredients. Fast-food and convenience-food meals often have lots of fat. Where can you learn more? Go to https://[employer]ged. org and sign in to your [employer] account. Enter J024 in the [facility] box to learn more about \"Low-Fat Diet for Gallbladder Disease: Care Instructions. \"     If you do not have an account, please click on the \"Sign Up Now\" link. Current as of: November 7, 2018  Content Version: 12.1  © 9953-9618 Healthwise, Incorporated. Care instructions adapted under license by [facility] ([facility]).  If you have questions about a medical

## 2019-08-27 ENCOUNTER — TELEPHONE (OUTPATIENT)
Dept: FAMILY MEDICINE CLINIC | Age: 64
End: 2019-08-27

## 2019-08-27 NOTE — TELEPHONE ENCOUNTER
The only lab results we have back from them so far is the D-dimer- which was normal. Can we see if we can get the results from [facility]? Please update patient on this.

## 2019-08-28 LAB — URINE CULTURE, ROUTINE: NORMAL

## 2019-08-29 ENCOUNTER — HOSPITAL ENCOUNTER (OUTPATIENT)
Dept: ULTRASOUND IMAGING | Age: 64
Discharge: HOME | End: 2019-08-29
Payer: COMMERCIAL

## 2019-08-29 DIAGNOSIS — R10.11 RUQ PAIN: ICD-10-CM

## 2019-08-29 PROCEDURE — 76705 ECHO EXAM OF ABDOMEN: CPT

## 2019-08-30 ENCOUNTER — TELEPHONE (OUTPATIENT)
Dept: FAMILY MEDICINE CLINIC | Age: 64
End: 2019-08-30

## 2019-08-30 ENCOUNTER — PATIENT MESSAGE (OUTPATIENT)
Dept: FAMILY MEDICINE CLINIC | Age: 64
End: 2019-08-30

## 2019-08-30 DIAGNOSIS — K82.8 GALLBLADDER SLUDGE: Primary | ICD-10-CM

## 2019-11-18 DIAGNOSIS — E78.5 HYPERLIPIDEMIA, UNSPECIFIED HYPERLIPIDEMIA TYPE: Primary | ICD-10-CM

## 2019-11-18 DIAGNOSIS — Z13.1 DIABETES MELLITUS SCREENING: ICD-10-CM

## 2019-12-09 ENCOUNTER — E-VISIT (OUTPATIENT)
Dept: FAMILY MEDICINE CLINIC | Age: 64
End: 2019-12-09
Payer: COMMERCIAL

## 2019-12-09 DIAGNOSIS — R05.9 COUGH: ICD-10-CM

## 2019-12-09 DIAGNOSIS — B96.89 ACUTE BACTERIAL SINUSITIS: Primary | ICD-10-CM

## 2019-12-09 DIAGNOSIS — J01.90 ACUTE BACTERIAL SINUSITIS: Primary | ICD-10-CM

## 2019-12-09 PROCEDURE — 98969 PR NONPHYSICIAN ONLINE ASSESSMENT AND MANAGEMENT: CPT | Performed by: PHYSICIAN ASSISTANT

## 2019-12-09 RX ORDER — ONDANSETRON 4 MG/1
4 TABLET, FILM COATED ORAL 3 TIMES DAILY PRN
Qty: 30 TABLET | Refills: 0 | Status: SHIPPED | OUTPATIENT
Start: 2019-12-09 | End: 2020-04-21 | Stop reason: ALTCHOICE

## 2019-12-09 RX ORDER — PREDNISONE 20 MG/1
20 TABLET ORAL 2 TIMES DAILY
Qty: 10 TABLET | Refills: 0 | Status: SHIPPED | OUTPATIENT
Start: 2019-12-09 | End: 2019-12-14

## 2019-12-09 RX ORDER — AMOXICILLIN 500 MG/1
500 CAPSULE ORAL 3 TIMES DAILY
Qty: 30 CAPSULE | Refills: 0 | Status: SHIPPED | OUTPATIENT
Start: 2019-12-09 | End: 2019-12-19

## 2019-12-09 RX ORDER — BENZONATATE 200 MG/1
200 CAPSULE ORAL 3 TIMES DAILY PRN
Qty: 21 CAPSULE | Refills: 0 | Status: SHIPPED | OUTPATIENT
Start: 2019-12-09 | End: 2019-12-16

## 2019-12-09 ASSESSMENT — LIFESTYLE VARIABLES: SMOKING_STATUS: NO, I'VE NEVER SMOKED

## 2020-01-07 ENCOUNTER — OFFICE VISIT (OUTPATIENT)
Dept: FAMILY MEDICINE CLINIC | Age: 65
End: 2020-01-07
Payer: COMMERCIAL

## 2020-01-07 VITALS
WEIGHT: 148.4 LBS | SYSTOLIC BLOOD PRESSURE: 122 MMHG | HEART RATE: 78 BPM | BODY MASS INDEX: 26.29 KG/M2 | TEMPERATURE: 98.6 F | RESPIRATION RATE: 16 BRPM | HEIGHT: 63 IN | DIASTOLIC BLOOD PRESSURE: 76 MMHG

## 2020-01-07 PROCEDURE — 96372 THER/PROPH/DIAG INJ SC/IM: CPT | Performed by: NURSE PRACTITIONER

## 2020-01-07 PROCEDURE — 99213 OFFICE O/P EST LOW 20 MIN: CPT | Performed by: NURSE PRACTITIONER

## 2020-01-07 RX ORDER — LEVOFLOXACIN 750 MG/1
750 TABLET ORAL DAILY
Qty: 5 TABLET | Refills: 0 | Status: SHIPPED | OUTPATIENT
Start: 2020-01-07 | End: 2020-01-12

## 2020-01-07 RX ORDER — METHYLPREDNISOLONE ACETATE 80 MG/ML
80 INJECTION, SUSPENSION INTRA-ARTICULAR; INTRALESIONAL; INTRAMUSCULAR; SOFT TISSUE ONCE
Status: COMPLETED | OUTPATIENT
Start: 2020-01-07 | End: 2020-01-07

## 2020-01-07 RX ADMIN — METHYLPREDNISOLONE ACETATE 80 MG: 80 INJECTION, SUSPENSION INTRA-ARTICULAR; INTRALESIONAL; INTRAMUSCULAR; SOFT TISSUE at 13:59

## 2020-01-07 ASSESSMENT — ENCOUNTER SYMPTOMS
SINUS PRESSURE: 1
SINUS PAIN: 1

## 2020-01-07 ASSESSMENT — PATIENT HEALTH QUESTIONNAIRE - PHQ9
SUM OF ALL RESPONSES TO PHQ QUESTIONS 1-9: 0
SUM OF ALL RESPONSES TO PHQ9 QUESTIONS 1 & 2: 0
SUM OF ALL RESPONSES TO PHQ QUESTIONS 1-9: 0
2. FEELING DOWN, DEPRESSED OR HOPELESS: 0
1. LITTLE INTEREST OR PLEASURE IN DOING THINGS: 0

## 2020-01-07 NOTE — PROGRESS NOTES
SUBJECTIVE:    Patient ID: Rizwan Mario is a 60 y.o. y.o. female. HPI  Chief Complaint   Patient presents with    Sinusitis     Patient is being seen today for a sinus infection that she has had for over a month    PT C/O  SINUS CONGESTION - PRESSURE IN FORE HEAD- NO EAR PAIN OR SORE THROAT - THE PRESSURE HAS GOTTEN ANY BETTER - COUGH AND NASAL DRAINAGE OF GREEN YELLOW STUFF  AMOXICILLIN - PREDNISONE DID NOT HELP- TRIED ADVIL COLD AND SINUS   Review of Systems   HENT: Positive for sinus pressure and sinus pain. All other systems reviewed and are negative. OBJECTIVE:  Vitals:    01/07/20 1301   BP: 122/76   Pulse: 78   Resp: 16   Temp: 98.6 °F (37 °C)       Physical Exam  Constitutional:       General: She is awake. Appearance: Normal appearance. She is well-developed, well-groomed and normal weight. She is not ill-appearing, toxic-appearing or diaphoretic. HENT:      Nose:      Right Sinus: Frontal sinus tenderness present. No maxillary sinus tenderness. Left Sinus: Frontal sinus tenderness present. No maxillary sinus tenderness. Pulmonary:      Effort: Pulmonary effort is normal.      Breath sounds: Normal breath sounds and air entry. Neurological:      Mental Status: She is alert. Psychiatric:         Attention and Perception: Attention normal.         Mood and Affect: Mood normal.         Speech: Speech normal.         Behavior: Behavior normal. Behavior is cooperative. Thought Content: Thought content normal.         Cognition and Memory: Cognition normal.         Judgment: Judgment normal.         Amada was seen today for sinusitis. Diagnoses and all orders for this visit:    Protracted URI   levofloxacin (LEVAQUIN) 750 MG tablet;  Take 1 tablet by mouth daily for 5 days  INSTRUCTED TO TAKE WITH PROBIOTIC  Instructions for Respiratory Infections (SAVE THIS SHEET)   For the first 7-14 days of symptoms follow instructions below, even before being seen in the office or the sides with a strong mouthwash ( Listerine or Scope) after meals and at bedtime at least 4 -5 times a day. This helps kill bacteria and viruses in the back of the throat and will shorten the duration and decrease the severity of your symptoms: sore throat, cough, ear popping,/ear pain, and possibly dizziness. 7. Smoking: Avoid smoking or exposure to second hand smoke. 8. Zinc: Zinc lozenges such as Cold Carroll, or Basic will help shorten the duration and lessen symptoms such as sore throat, cough, nasal congestion, runny nose, and post nasal drip. Use 1 lozenge every 2-4 hours ( after meals if stomach is sensitive). Children can use 10-15 mg. Or less 3-4 times a day or Zinc lollypops. In pregnancy limit to 50-60 mg. A day for 7 days as prenatals have Zinc also. With diarrhea use zinc pills 50 mg 1/2 to 1 pill 2x/day. 9. Vitamins: Vitamin C 500 mg. With breakfast and dinner. Children and pregnant women should drink citrus juices. This speeds healing and strengthens immune system. 10. Chest Symptoms: Vicks Vapor rub to the chest at bedtime. 11. Decongestants: Avoid all decongestants and antihistamine cold preparations in children. Try all of the above starting with day 1 of symptoms. If Strep throat symptoms appear call to be seen in the office as soon as possible and don't gargle on that day. Newborns, infants, or anyone with earaches or influenza may need to be seen quickly. Adults with fevers over 103 degrees or shortness of breath should call the office immediately.

## 2020-03-04 LAB
A/G RATIO: 1.7 (CALC) (ref 1–2.5)
ALBUMIN SERPL-MCNC: 4.4 G/DL (ref 3.6–5.1)
ALP BLD-CCNC: 91 U/L (ref 37–153)
ALT SERPL-CCNC: 21 U/L (ref 6–29)
AST SERPL-CCNC: 20 U/L (ref 10–35)
BILIRUB SERPL-MCNC: 0.7 MG/DL (ref 0.2–1.2)
BUN / CREAT RATIO: NORMAL (CALC) (ref 6–22)
BUN BLDV-MCNC: 15 MG/DL (ref 7–25)
CALCIUM SERPL-MCNC: 9.4 MG/DL (ref 8.6–10.4)
CHLORIDE BLD-SCNC: 104 MMOL/L (ref 98–110)
CHOLESTEROL, TOTAL: 166 MG/DL
CHOLESTEROL/HDL RATIO: 2.6 (CALC)
CHOLESTEROL: 101 MG/DL (CALC)
CO2: 29 MMOL/L (ref 20–32)
COPY TO: NORMAL
CREAT SERPL-MCNC: 0.72 MG/DL (ref 0.5–0.99)
GFR AFRICAN AMERICAN: 103 ML/MIN/1.73M2
GFR, ESTIMATED: 89 ML/MIN/1.73M2
GLOBULIN: 2.6 G/DL (CALC) (ref 1.9–3.7)
GLUCOSE BLD-MCNC: 81 MG/DL (ref 65–99)
HDLC SERPL-MCNC: 65 MG/DL
LDL CHOLESTEROL CALCULATED: 83 MG/DL (CALC)
POTASSIUM SERPL-SCNC: 4.6 MMOL/L (ref 3.5–5.3)
SODIUM BLD-SCNC: 141 MMOL/L (ref 135–146)
TOTAL PROTEIN: 7 G/DL (ref 6.1–8.1)
TRIGL SERPL-MCNC: 85 MG/DL

## 2020-03-09 RX ORDER — ATORVASTATIN CALCIUM 10 MG/1
TABLET, FILM COATED ORAL
Qty: 90 TABLET | Refills: 3 | Status: SHIPPED | OUTPATIENT
Start: 2020-03-09 | End: 2020-03-18 | Stop reason: SDUPTHER

## 2020-03-18 RX ORDER — ATORVASTATIN CALCIUM 10 MG/1
TABLET, FILM COATED ORAL
Qty: 90 TABLET | Refills: 3 | Status: SHIPPED | OUTPATIENT
Start: 2020-03-18 | End: 2021-03-09

## 2020-04-21 ENCOUNTER — TELEMEDICINE (OUTPATIENT)
Dept: FAMILY MEDICINE CLINIC | Age: 65
End: 2020-04-21
Payer: COMMERCIAL

## 2020-04-21 VITALS — WEIGHT: 142 LBS | HEIGHT: 63 IN | BODY MASS INDEX: 25.16 KG/M2

## 2020-04-21 PROCEDURE — 99213 OFFICE O/P EST LOW 20 MIN: CPT | Performed by: NURSE PRACTITIONER

## 2020-04-21 RX ORDER — CYANOCOBALAMIN (VITAMIN B-12) 1000 MCG
2 TABLET ORAL DAILY
COMMUNITY

## 2020-04-21 RX ORDER — ALPRAZOLAM 0.5 MG/1
0.5 TABLET ORAL NIGHTLY PRN
COMMUNITY

## 2020-04-21 RX ORDER — ALPRAZOLAM 0.5 MG/1
0.5 TABLET ORAL 2 TIMES DAILY PRN
Qty: 60 TABLET | Refills: 1 | Status: SHIPPED | OUTPATIENT
Start: 2020-04-21 | End: 2021-09-21 | Stop reason: SDUPTHER

## 2020-04-21 NOTE — PROGRESS NOTES
2020     Augustine Munguia (:  1955) is a 63 y.o. female, here for evaluation of the following medical concerns:  Augustine Munguia is a 63 y.o. female being evaluated by a Virtual Visit (video visit) encounter to address concerns as mentioned above. A caregiver was present when appropriate. Due to this being a TeleHealth encounter (During GEBEY-57 public health emergency), evaluation of the following organ systems was limited: Vitals/Constitutional/EENT/Resp/CV/GI//MS/Neuro/Skin/Heme-Lymph-Imm. Pursuant to the emergency declaration under the [address] [place] [address] authority and the [employer] Act, this Virtual Visit was conducted with patient's (and/or legal guardian's) consent, to reduce the patient's risk of exposure to COVID-19 and provide necessary medical care. The patient (and/or legal guardian) has also been advised to contact this office for worsening conditions or problems, and seek emergency medical treatment and/or call 911 if deemed necessary. Services were provided through a video synchronous discussion virtually to substitute for in-person clinic visit. Patient and provider were located at their individual homes. --Claude Brawn, APRN - CNP on 2020 at 8:03 AM    An electronic signature was used to authenticate this note. HPI   Chief Complaint   Patient presents with    Insomnia     INSOMNIA ROUTINE FOLLOW UP, MED REFILL     Insomnia-anxiety  She has had a hard time dealing with COVID- HERE MOM IS IN AN ASSISTED LIVING HOME ANDD SHE CANT GO SEE HER - WORRIED ABOUT HER GRAND KIDS- HAVING A HARD TIME SLEEPING ING BECAUSE SHE CANT STOP WORRYING ABOUT STUFF:  Current treatment: melatonin use, which has been not very effective. Medication side effects: none. Review of Systems   Psychiatric/Behavioral: Positive for sleep disturbance. The patient is nervous/anxious. Prior to Visit Medications    Medication Sig Taking? Authorizing Provider   atorvastatin (LIPITOR) 10 MG tablet TAKE 1 TABLET BY MOUTH ONE TIME A DAY  Sharonda Richmond APRN - CNP   ondansetron (ZOFRAN) 4 MG tablet Take 1 tablet by mouth 3 times daily as needed for Nausea or Vomiting  Daron Burris PA-C   Diclofenac Sodium  MG TB24 TAKE 1 TABLET BY MOUTH ONE TIME A DAY  RADAMES Hubbard - CNP   Multiple Vitamins-Minerals (THERAPEUTIC MULTIVITAMIN-MINERALS) tablet Take 1 tablet by mouth daily  Historical Provider, MD   Omega-3 Fatty Acids (FISH OIL PO) Take by mouth  Historical Provider, MD        Social History     Tobacco Use    Smoking status: Never Smoker    Smokeless tobacco: Never Used   Substance Use Topics    Alcohol use: Not on file        There were no vitals filed for this visit. Estimated body mass index is 26.29 kg/m² as calculated from the following:    Height as of 1/7/20: 5' 3\" (1.6 m). Weight as of 1/7/20: 148 lb 6.4 oz (67.3 kg). Physical Exam  Constitutional:       General: She is awake. She is not in acute distress. Appearance: Normal appearance. She is well-developed, well-groomed and normal weight. She is not ill-appearing, toxic-appearing or diaphoretic. Neurological:      Mental Status: She is alert. GCS: GCS eye subscore is 4. GCS verbal subscore is 5. GCS motor subscore is 6. Psychiatric:         Attention and Perception: Attention and perception normal.         Mood and Affect: Mood and affect normal.         Speech: Speech normal.         Behavior: Behavior normal. Behavior is cooperative. Thought Content: Thought content normal.         Cognition and Memory: Cognition and memory normal.         Mare Sullivan was seen today for insomnia.     Diagnoses and all orders for this visit:    Anxiety  Psychophysiological insomnia  Controlled substances monitoring: possible medication side effects, risk of tolerance and/or dependence, and alternative treatments discussed and OARRS report reviewed today- activity consistent with treatment plan. -     ALPRAZolam (XANAX) 0.5 MG tablet; Take 1 tablet by mouth 2 times daily as needed for Sleep or Anxiety for up to 30 days. FILL 4/21 sedation precautions dw pt  She is encouraged to use relaxation techniques at night - deep breathing- imagery- white noise- etc      Psychophysiological insomnia  -     ALPRAZolam (XANAX) 0.5 MG tablet; Take 1 tablet by mouth 2 times daily as needed for Sleep or Anxiety for up to 30 days. FILL 4/21        ASSESSMENT/PLAN:  There are no diagnoses linked to this encounter. No follow-ups on file. An electronic signature was used to authenticate this note.     --RADAMES Jarvis - CNP on 4/21/2020 at 7:28 AM

## 2020-06-06 LAB
COPY TO: NORMAL
SARS-COV-2, IGG: NEGATIVE

## 2020-10-12 ENCOUNTER — PATIENT MESSAGE (OUTPATIENT)
Dept: FAMILY MEDICINE CLINIC | Age: 65
End: 2020-10-12

## 2020-10-12 NOTE — TELEPHONE ENCOUNTER
From: Maciej Mendoza  To: RADAMES Guillaume - CNP  Sent: 10/12/2020 11:17 AM EDT  Subject: Non-Urgent Medical Question    Please saige my records as having both the flu shot and shingles vaccine for 2020.  Thanks

## 2020-12-10 ENCOUNTER — TELEPHONE (OUTPATIENT)
Dept: FAMILY MEDICINE CLINIC | Age: 65
End: 2020-12-10

## 2020-12-11 NOTE — TELEPHONE ENCOUNTER
THE ONLY APPT AVAILABLE IS 12/16/2020 WITH ANASTASIA AT 4PM I CALLED PT AND LEFT DETAILED MESSAGE. IF PT CALLS BACK PLEASE CONFIRM SHE IS ABLE TO MAKE THAT APPT. Kelli Dang

## 2020-12-16 ENCOUNTER — OFFICE VISIT (OUTPATIENT)
Dept: FAMILY MEDICINE CLINIC | Age: 65
End: 2020-12-16
Payer: COMMERCIAL

## 2020-12-16 VITALS
SYSTOLIC BLOOD PRESSURE: 112 MMHG | RESPIRATION RATE: 18 BRPM | HEART RATE: 72 BPM | OXYGEN SATURATION: 98 % | TEMPERATURE: 97 F | BODY MASS INDEX: 25.52 KG/M2 | WEIGHT: 144 LBS | HEIGHT: 63 IN | DIASTOLIC BLOOD PRESSURE: 74 MMHG

## 2020-12-16 PROCEDURE — 93000 ELECTROCARDIOGRAM COMPLETE: CPT | Performed by: NURSE PRACTITIONER

## 2020-12-16 PROCEDURE — 99215 OFFICE O/P EST HI 40 MIN: CPT | Performed by: NURSE PRACTITIONER

## 2020-12-16 NOTE — PROGRESS NOTES
Xray  Preoperative Consultation    PT HAS HADCHRONIC BACK PAIN FOR THE LAST YEAR BUT HAS GOTTEN WORSE SINCE THE SUMMER - SHE ALSO HAS PARESTHESIA DROM LUMBAR SPINE RADIATING INTO HER RIGHT LEG WORSE IN THE AM- HAD AN JAMEL/CHIROPRACTIC CARE THAT DID NOT HELP MUCH-     CLINT Melgar 89   YOB: 1955    Date of Service:  12/16/2020    There were no vitals filed for this visit. Wt Readings from Last 2 Encounters:   04/21/20 142 lb (64.4 kg)   01/07/20 148 lb 6.4 oz (67.3 kg)     BP Readings from Last 3 Encounters:   01/07/20 122/76   08/26/19 116/80   02/20/19 116/72        Chief Complaint   Patient presents with    Pre-op Exam     Allergies   Allergen Reactions    Ancef [Cefazolin]     Codeine     Cyclobenzaprine     Erythromycin     Sulfa Antibiotics     Sulfamethazine      No outpatient medications have been marked as taking for the 12/16/20 encounter (Office Visit) with RADAMES Gage CNP. This patient presents to the office today for a preoperative consultation at the request of surgeon, Dr Dominique Marcelino, who plans on performing  Decompressive lumbar laminectomy with facetvcyst removal posterior spinal fusion with instrumentation and allograft and transforaminal lumbar interbody fusion on December 28 at [facility]. The current problem began 1 year ago, and symptoms have been worsening with time. Conservative therapy: Yes: JAMEL- THERAPY, which has been not very effective. .    Planned anesthesia: General   Known anesthesia problems: NAUSEA AND HAS HAD DIFFICULTY COMING OUT OF SURGERY  Bleeding risk: No recent or remote history of abnormal bleeding  Personal or FH of DVT/PE: Yes - BROTHER    Patient objection to receiving blood products: No    Patient Active Problem List   Diagnosis    Anxiety    Hypercholesteremia    Vitamin D deficiency       No past medical history on file.   Past Surgical History:   Procedure Laterality Date    FOOT SURGERY Right 1990    x3 surgery bunion, neuroma, correction     Family History   Problem Relation Age of Onset    Alzheimer's Disease Brother     Heart Disease Father 47        obese smoker - fatal mi    Cancer Sister         breast    Crohn's Disease Sister         short bowel syndrome    Thyroid Disease Sister      Social History     Socioeconomic History    Marital status:      Spouse name: Not on file    Number of children: Not on file    Years of education: Not on file    Highest education level: Not on file   Occupational History    Not on file   Social Needs    Financial resource strain: Not on file    Food insecurity     Worry: Not on file     Inability: Not on file    Transportation needs     Medical: Not on file     Non-medical: Not on file   Tobacco Use    Smoking status: Never Smoker    Smokeless tobacco: Never Used   Substance and Sexual Activity    Alcohol use: Not on file    Drug use: Not on file    Sexual activity: Not on file   Lifestyle    Physical activity     Days per week: Not on file     Minutes per session: Not on file    Stress: Not on file   Relationships    Social connections     Talks on phone: Not on file     Gets together: Not on file     Attends Jain service: Not on file     Active member of club or organization: Not on file     Attends meetings of clubs or organizations: Not on file     Relationship status: Not on file    Intimate partner violence     Fear of current or ex partner: Not on file     Emotionally abused: Not on file     Physically abused: Not on file     Forced sexual activity: Not on file   Other Topics Concern    Not on file   Social History Narrative    Not on file       Review of Systems  A comprehensive review of systems was negative except for what was noted in the HPI. Physical Exam   Constitutional: She is oriented to person, place, and time. She appears well-developed and well-nourished. No distress. HENT:   Head: Normocephalic and atraumatic.    Mouth/Throat: Uvula is midline, oropharynx is clear and moist and mucous membranes are normal.   Eyes: Conjunctivae and EOM are normal. Pupils are equal, round, and reactive to light. Neck: Trachea normal and normal range of motion. Neck supple. No JVD present. Carotid bruit is not present. No mass and no thyromegaly present. Cardiovascular: Normal rate, regular rhythm, normal heart sounds and intact distal pulses. Exam reveals no gallop and no friction rub. No murmur heard. Pulmonary/Chest: Effort normal and breath sounds normal. No respiratory distress. She has no wheezes. She has no rales. Abdominal: Soft. Normal aorta and bowel sounds are normal. She exhibits no distension and no mass. There is no hepatosplenomegaly. No tenderness. Musculoskeletal: She exhibits no edema and no tenderness. Neurological: She is alert and oriented to person, place, and time. She has normal strength. No cranial nerve deficit or sensory deficit. Coordination and gait normal.   Skin: Skin is warm and dry. No rash noted. No erythema. Psychiatric: She has a normal mood and affect. Her behavior is normal.     EKG Interpretation:  normal EKG, normal sinus rhythm, unchanged from previous tracings. Lab Review WILL HAVE BLOOD WORK DRAWN AT [facility]        Assessment:    Ibis Gagnon was seen today for pre-op exam.    Diagnoses and all orders for this visit:    Spondylolisthesis of lumbar region  Radiculopathy, lumbar region  Neurogenic claudication due to lumbar spinal stenosis  Pre-op exam  -     EKG 12 Lead nsr  -     XR CHEST (2 VW); Future       63 y.o. patient with planned surgery as above. Known risk factors for perioperative complications: None  Current medications which may produce withdrawal symptoms if withheld perioperatively: none      Plan:     1. Preoperative workup as follows: chest x-ray, ECG- WILL HAVE BLOOD WORK DONE AT [facility]  2. Change in medication regimen before surgery: None  3.  Prophylaxis for cardiac events with perioperative beta-blockers: Not indicated  ACC/AHA indications for pre-operative beta-blocker use:    · Vascular surgery with history of postitive stress test  · Intermediate or high risk surgery with history of CAD   · Intermediate or high risk surgery with multiple clinical predictors of CAD- 2 of the following: history of compensated or prior heart failure, history of cerebrovascular disease, DM, or renal insufficiency    Routine administration of higher-dose, long-acting metoprolol in beta-blocker-naïve patients on the day of surgery, and in the absence of dose titration is associated with an overall increase in mortality. Beta-blockers should be started days to weeks prior to surgery and titrated to pulse < 70.  4. Deep vein thrombosis prophylaxis: regimen to be chosen by surgical team  5.  Surgery should be delayed until labs are reviewed by Dr Kelli Handy or associate  Paperwork faxed to  [phone]

## 2020-12-16 NOTE — PATIENT INSTRUCTIONS
Patient Education        Learning About How to Have a Healthy Back  What causes back pain? Back pain is often caused by overuse, strain, or injury. For example, people often hurt their backs playing sports or working in the yard, being jolted in a car accident, or lifting something too heavy. Aging plays a part too. Your bones and muscles tend to lose strength as you age, which makes injury more likely. The spongy discs between the bones of the spine (vertebrae) may suffer from wear and tear and no longer provide enough cushion between the bones. A disc that bulges or breaks open (herniated disc) can press on nerves, causing back pain. In some people, back pain is the result of arthritis, broken vertebrae caused by bone loss (osteoporosis), illness, or a spine problem. Although most people have back pain at one time or another, there are steps you can take to make it less likely. How can you have a healthy back? Reduce stress on your back through good posture   Slumping or slouching alone may not cause low back pain. But after the back has been strained or injured, bad posture can make pain worse. · Sleep in a position that maintains your back's normal curves and on a mattress that feels comfortable. Sleep on your side with a pillow between your knees, or sleep on your back with a pillow under your knees. These positions can reduce strain on your back. · Stand and sit up straight. \"Good posture\" generally means your ears, shoulders, and hips are in a straight line. · If you must stand for a long time, put one foot on a stool, ledge, or box. Switch feet every now and then. · Sit in a chair that is low enough to let you place both feet flat on the floor with both knees nearly level with your hips. If your chair or desk is too high, use a footrest to raise your knees. Place a small pillow, a rolled-up towel, or a lumbar roll in the curve of your back if you need extra support.   · Try a kneeling chair, which helps tilt your hips forward. This takes pressure off your lower back. · Try sitting on an exercise ball. It can rock from side to side, which helps keep your back loose. · When driving, keep your knees nearly level with your hips. Sit straight, and drive with both hands on the steering wheel. Your arms should be in a slightly bent position. Reduce stress on your back through careful lifting   · Squat down, bending at the hips and knees only. If you need to, put one knee to the floor and extend your other knee in front of you, bent at a right angle (half kneeling). · Press your chest straight forward. This helps keep your upper back straight while keeping a slight arch in your low back. · Hold the load as close to your body as possible, at the level of your belly button (navel). · Use your feet to change direction, taking small steps. · Lead with your hips as you change direction. Keep your shoulders in line with your hips as you move. · Set down your load carefully, squatting with your knees and hips only. Exercise and stretch your back   · Do some exercise on most days of the week, if your doctor says it is okay. You can walk, run, swim, or cycle. · Stretch your back muscles. Here are a few exercises to try:  ? Lie on your back, and gently pull one bent knee to your chest. Put that foot back on the floor, and then pull the other knee to your chest.  ? Do pelvic tilts. Lie on your back with your knees bent. Tighten your stomach muscles. Pull your belly button (navel) in and up toward your ribs. You should feel like your back is pressing to the floor and your hips and pelvis are slightly lifting off the floor. Hold for 6 seconds while breathing smoothly. ? Sit with your back flat against a wall. · Keep your core muscles strong. The muscles of your back, belly (abdomen), and buttocks support your spine. ? Pull in your belly and imagine pulling your navel toward your spine.  Hold this for 6 seconds, then relax. Remember to keep breathing normally as you tense your muscles. ? Do curl-ups. Always do them with your knees bent. Keep your low back on the floor, and curl your shoulders toward your knees using a smooth, slow motion. Keep your arms folded across your chest. If this bothers your neck, try putting your hands behind your neck (not your head), with your elbows spread apart. ? Lie on your back with your knees bent and your feet flat on the floor. Tighten your belly muscles, and then push with your feet and raise your buttocks up a few inches. Hold this position 6 seconds as you continue to breathe normally, then lower yourself slowly to the floor. Repeat 8 to 12 times. ? If you like group exercise, try Pilates or yoga. These classes have poses that strengthen the core muscles. Lead a healthy lifestyle   · Stay at a healthy weight to avoid strain on your back. · Do not smoke. Smoking increases the risk of osteoporosis, which weakens the spine. If you need help quitting, talk to your doctor about stop-smoking programs and medicines. These can increase your chances of quitting for good. Where can you learn more? Go to https://ADOPpeWorksteady.io.TourRadar. org and sign in to your [employer] account. Enter L315 in the [employer] box to learn more about \"Learning About How to Have a Healthy Back. \"     If you do not have an account, please click on the \"Sign Up Now\" link. Current as of: March 2, 2020               Content Version: 12.6  © 2006-2020 [employer]. Care instructions adapted under license by [facility] ([facility]). If you have questions about a medical condition or this instruction, always ask your healthcare professional. [place] [address] any warranty or liability for your use of this information.

## 2020-12-17 ENCOUNTER — HOSPITAL ENCOUNTER (OUTPATIENT)
Age: 65
Discharge: HOME | End: 2020-12-17
Payer: COMMERCIAL

## 2020-12-17 ENCOUNTER — HOSPITAL ENCOUNTER (OUTPATIENT)
Dept: GENERAL RADIOLOGY | Age: 65
Discharge: HOME | End: 2020-12-17
Payer: COMMERCIAL

## 2020-12-17 PROCEDURE — 71046 X-RAY EXAM CHEST 2 VIEWS: CPT

## 2021-03-09 DIAGNOSIS — E78.5 HYPERLIPIDEMIA, UNSPECIFIED HYPERLIPIDEMIA TYPE: ICD-10-CM

## 2021-03-09 RX ORDER — ATORVASTATIN CALCIUM 10 MG/1
TABLET, FILM COATED ORAL
Qty: 90 TABLET | Refills: 1 | Status: SHIPPED | OUTPATIENT
Start: 2021-03-09 | End: 2021-09-07

## 2021-09-21 ENCOUNTER — OFFICE VISIT (OUTPATIENT)
Dept: FAMILY MEDICINE CLINIC | Age: 66
End: 2021-09-21
Payer: COMMERCIAL

## 2021-09-21 ENCOUNTER — TELEPHONE (OUTPATIENT)
Dept: FAMILY MEDICINE CLINIC | Age: 66
End: 2021-09-21

## 2021-09-21 VITALS
HEIGHT: 63 IN | SYSTOLIC BLOOD PRESSURE: 120 MMHG | RESPIRATION RATE: 12 BRPM | WEIGHT: 148.2 LBS | DIASTOLIC BLOOD PRESSURE: 70 MMHG | TEMPERATURE: 97.2 F | HEART RATE: 80 BPM | OXYGEN SATURATION: 96 % | BODY MASS INDEX: 26.26 KG/M2

## 2021-09-21 DIAGNOSIS — Z00.00 ANNUAL PHYSICAL EXAM: Primary | ICD-10-CM

## 2021-09-21 DIAGNOSIS — F51.04 PSYCHOPHYSIOLOGICAL INSOMNIA: ICD-10-CM

## 2021-09-21 DIAGNOSIS — Z23 NEED FOR TDAP VACCINATION: ICD-10-CM

## 2021-09-21 DIAGNOSIS — F41.9 ANXIETY: ICD-10-CM

## 2021-09-21 DIAGNOSIS — Z23 NEED FOR INFLUENZA VACCINATION: ICD-10-CM

## 2021-09-21 DIAGNOSIS — Z12.31 ENCOUNTER FOR SCREENING MAMMOGRAM FOR MALIGNANT NEOPLASM OF BREAST: ICD-10-CM

## 2021-09-21 DIAGNOSIS — H02.403 PTOSIS OF BOTH EYELIDS: ICD-10-CM

## 2021-09-21 PROCEDURE — 90472 IMMUNIZATION ADMIN EACH ADD: CPT | Performed by: NURSE PRACTITIONER

## 2021-09-21 PROCEDURE — 90715 TDAP VACCINE 7 YRS/> IM: CPT | Performed by: NURSE PRACTITIONER

## 2021-09-21 PROCEDURE — 90674 CCIIV4 VAC NO PRSV 0.5 ML IM: CPT | Performed by: NURSE PRACTITIONER

## 2021-09-21 PROCEDURE — 99396 PREV VISIT EST AGE 40-64: CPT | Performed by: NURSE PRACTITIONER

## 2021-09-21 PROCEDURE — 90471 IMMUNIZATION ADMIN: CPT | Performed by: NURSE PRACTITIONER

## 2021-09-21 RX ORDER — ALPRAZOLAM 0.5 MG/1
0.5 TABLET ORAL 4 TIMES DAILY PRN
Qty: 42 TABLET | Refills: 0 | Status: SHIPPED | OUTPATIENT
Start: 2021-09-21 | End: 2022-01-04 | Stop reason: SDUPTHER

## 2021-09-21 RX ORDER — ALPRAZOLAM 0.5 MG/1
0.5 TABLET ORAL NIGHTLY PRN
Status: CANCELLED | OUTPATIENT
Start: 2021-09-21

## 2021-09-21 SDOH — ECONOMIC STABILITY: TRANSPORTATION INSECURITY
IN THE PAST 12 MONTHS, HAS THE LACK OF TRANSPORTATION KEPT YOU FROM MEDICAL APPOINTMENTS OR FROM GETTING MEDICATIONS?: NO

## 2021-09-21 SDOH — ECONOMIC STABILITY: TRANSPORTATION INSECURITY
IN THE PAST 12 MONTHS, HAS LACK OF TRANSPORTATION KEPT YOU FROM MEETINGS, WORK, OR FROM GETTING THINGS NEEDED FOR DAILY LIVING?: NO

## 2021-09-21 SDOH — ECONOMIC STABILITY: FOOD INSECURITY: WITHIN THE PAST 12 MONTHS, YOU WORRIED THAT YOUR FOOD WOULD RUN OUT BEFORE YOU GOT MONEY TO BUY MORE.: NEVER TRUE

## 2021-09-21 SDOH — ECONOMIC STABILITY: FOOD INSECURITY: WITHIN THE PAST 12 MONTHS, THE FOOD YOU BOUGHT JUST DIDN'T LAST AND YOU DIDN'T HAVE MONEY TO GET MORE.: NEVER TRUE

## 2021-09-21 ASSESSMENT — PATIENT HEALTH QUESTIONNAIRE - PHQ9
SUM OF ALL RESPONSES TO PHQ QUESTIONS 1-9: 0
SUM OF ALL RESPONSES TO PHQ QUESTIONS 1-9: 0
SUM OF ALL RESPONSES TO PHQ9 QUESTIONS 1 & 2: 0
1. LITTLE INTEREST OR PLEASURE IN DOING THINGS: 0
SUM OF ALL RESPONSES TO PHQ QUESTIONS 1-9: 0
2. FEELING DOWN, DEPRESSED OR HOPELESS: 0

## 2021-09-21 ASSESSMENT — SOCIAL DETERMINANTS OF HEALTH (SDOH): HOW HARD IS IT FOR YOU TO PAY FOR THE VERY BASICS LIKE FOOD, HOUSING, MEDICAL CARE, AND HEATING?: NOT HARD AT ALL

## 2021-09-21 NOTE — PROGRESS NOTES
History and Physical      Brad Ascencio  YOB: 1955    Date of Service:  9/21/2021    Chief Complaint:   Brad Ascencio is a 61 y.o. female who presents for complete physical examination. HPI:    ANNUAL EXAM  LABS DRAWN SEVERAL WEEKS AGO- REVIEWED TODAY  ANXIETY SHE DOES NOT USE THE XANAX OFTEN LAST FILLED 4/21 SHE WORRIES ABOUT EVERYTHING    WOULD LIKE TO HAVE HER FLU - TDAP VACCINE  HAD PAP  2020 [facility]      Wt Readings from Last 3 Encounters:   12/16/20 144 lb (65.3 kg)   04/21/20 142 lb (64.4 kg)   01/07/20 148 lb 6.4 oz (67.3 kg)     BP Readings from Last 3 Encounters:   12/16/20 112/74   01/07/20 122/76   08/26/19 116/80       Patient Active Problem List   Diagnosis    Anxiety    Hypercholesteremia    Vitamin D deficiency       Allergies   Allergen Reactions    Ancef [Cefazolin]     Codeine     Cyclobenzaprine     Erythromycin     Sulfa Antibiotics     Sulfamethazine      Outpatient Medications Marked as Taking for the 9/21/21 encounter (Office Visit) with RADAMES Slater CNP   Medication Sig Dispense Refill    vitamin D 25 MCG (1000 UT) CAPS Take 1 capsule by mouth daily      atorvastatin (LIPITOR) 10 MG tablet TAKE 1 TABLET BY MOUTH EVERY DAY 90 tablet 0    Bioflavonoid Products (VITAMIN C) CHEW Take 2 tablets by mouth daily      Cyanocobalamin (B-12) 1000 MCG TABS Take 2 tablets by mouth daily      Multiple Vitamins-Minerals (THERAPEUTIC MULTIVITAMIN-MINERALS) tablet Take 1 tablet by mouth daily      Omega-3 Fatty Acids (FISH OIL PO) Take by mouth         No past medical history on file.   Past Surgical History:   Procedure Laterality Date    FOOT SURGERY Right 1990    x3 surgery bunion, neuroma, correction     Family History   Problem Relation Age of Onset    Alzheimer's Disease Brother     Heart Disease Father 47        obese smoker - fatal mi    Cancer Sister         breast    Crohn's Disease Sister         short bowel syndrome    Thyroid Disease Sister      Social History     Socioeconomic History    Marital status:      Spouse name: Not on file    Number of children: Not on file    Years of education: Not on file    Highest education level: Not on file   Occupational History    Not on file   Tobacco Use    Smoking status: Never Smoker    Smokeless tobacco: Never Used   Substance and Sexual Activity    Alcohol use: Not on file    Drug use: Not on file    Sexual activity: Not on file   Other Topics Concern    Not on file   Social History Narrative    Not on file     Social Determinants of Health     Financial Resource Strain: Low Risk     Difficulty of Paying Living Expenses: Not hard at all   Food Insecurity: No Food Insecurity    Worried About Running Out of Food in the Last Year: Never true    [place] of Food in the Last Year: Never true   Transportation Needs: No Transportation Needs    Lack of Transportation (Medical): No    Lack of Transportation (Non-Medical): No   Physical Activity:     Days of Exercise per Week:     Minutes of Exercise per Session:    Stress:     Feeling of Stress :    Social Connections:     Frequency of Communication with Friends and Family:     Frequency of Social Gatherings with Friends and Family:     Attends Jehovah's witness Services:     Active Member of Clubs or Organizations:     Attends Club or Organization Meetings:     Marital Status:    Intimate Partner Violence:     Fear of Current or Ex-Partner:     Emotionally Abused:     Physically Abused:     Sexually Abused:        Review of Systems:  A comprehensive review of systems was negative. Physical Exam:   There were no vitals filed for this visit. There is no height or weight on file to calculate BMI. Constitutional: She is oriented to person, place, and time. She appears well-developed and well-nourished. No distress. HEENT:   Head: Normocephalic and atraumatic.    Right Ear: Tympanic membrane, external ear and ear canal normal.   Left Ear: Tympanic membrane, external ear and ear canal normal.   Mouth/Throat: Oropharynx is clear and moist, and mucous membranes are normal.  There is no cervical adenopathy. Eyes: Conjunctivae and extraocular motions are normal. Pupils are equal, round, and reactive to light. Neck: Supple. No JVD present. Carotid bruit is not present. No mass and no thyromegaly present. Cardiovascular: Normal rate, regular rhythm, normal heart sounds and intact distal pulses. Exam reveals no gallop and no friction rub. No murmur heard. Pulmonary/Chest: Effort normal and breath sounds normal. No respiratory distress. She has no wheezes, rhonchi or rales. Abdominal: Soft, non-tender. Bowel sounds and aorta are normal. She exhibits no organomegaly, mass or bruit. Musculoskeletal: Normal range of motion, no synovitis. She exhibits no edema. Neurological: She is alert and oriented to person, place, and time. She has normal reflexes. No cranial nerve deficit. Coordination normal.   Skin: Skin is warm and dry. There is no rash or erythema. No suspicious lesions noted. Psychiatric: She has a normal mood and affect.  Her speech is normal and behavior is normal. Judgment, cognition and memory are normal.     Preventive Care:  Health Maintenance   Topic Date Due    COVID-19 Vaccine (1) Never done    Cervical cancer screen  08/01/2018    DTaP/Tdap/Td vaccine (2 - Td or Tdap) 05/03/2021    Flu vaccine (1) 09/01/2021    Lipid screen  08/23/2022    Breast cancer screen  12/07/2022    Colon cancer screen colonoscopy  12/19/2024    Shingles Vaccine  Completed    Hepatitis A vaccine  Aged Out    Hepatitis B vaccine  Aged Out    Hib vaccine  Aged Out    Meningococcal (ACWY) vaccine  Aged Out    Pneumococcal 0-64 years Vaccine  Aged Out    Hepatitis C screen  Discontinued    HIV screen  Discontinued      Hx abnormal PAP: no  Sexual activity: single partner, contraception - none   Self-breast exams: yes  Last eye exam: 2020, WILL SCHEDULE AN APPOINTMENT   Exercise: no regular exercise  Seatbelt use: YES       Preventive plan of care for Rasheeda Bond        9/21/2021           Preventive Measures Status       Recommendations for screening   Colon Cancer Screen   Last colonoscopy: 2014 Repeat in 10 years   Breast Cancer Screen  Last mammogram: 2019  Test recommended and ordered   Cervical Cancer Screen   Last PAP smear: 2020 Repeat every 3 years   Osteoporosis Screen   Last DXA scan: N/A This test is not clinically indicated   Diabetes Screen  Glucose (mg/dL)   Date Value   03/03/2020 81    Repeat yearly   Cholesterol Screen  Lab Results   Component Value Date    CHOL 165 08/23/2021    TRIG 121 08/23/2021    HDL 56 08/23/2021    LDLCALC 87 08/23/2021    Repeat yearly   Aspirin for Cardiovascular Prevention   No Not indicated   Weight: There is no height or weight on file to calculate BMI.         Your BMI is between 18.5 and 24.9, which indicates that you are at a healthy weight   Living Will: No   Patient declined    Recommended Immunizations    Immunization History   Administered Date(s) Administered    Influenza A ([id number]) Vaccine PF IM 01/07/2010    Influenza Vaccine, unspecified formulation 10/01/2017    Influenza Virus Vaccine 10/01/2009, 10/29/2014, 10/01/2015, 10/01/2017, 09/14/2018, 10/19/2019, 10/10/2020    Influenza Whole 10/01/2015    Influenza, Intradermal, Quadrivalent, Preservative Free 10/07/2016    Influenza, Quadv, IM, (6 mo and older Fluzone, Flulaval, Fluarix and 3 yrs and older Afluria) 09/14/2018    Tdap (Boostrix, Adacel) 05/03/2011    Zoster Live (Zostavax) 05/07/2013    Zoster Recombinant (Shingrix) 11/06/2019, 01/01/2020, 01/25/2020        Influenza vaccine:  recommended every fall  Tetanus vaccine:  tetanus, diptheria and pertussis vaccine (Tdap) administered today- risks and benefits discussed         Other Recommendations ·   See an eye specialist every 1 years  · See a dentist every PF, PREFILL SYR OR SDV, 0.5ML (FLUCELVAX QUADV, PF)    Need for Tdap vaccination  -     Tdap (age 10y-63y) IM (ADACEL)

## 2021-09-21 NOTE — TELEPHONE ENCOUNTER
CALLED PT AND ADVISED I TRIED TO CALL [facility] AND A RECORDING STATED ALL LINES ARE BUSY RIGHT NOW PLEASE TRY YOUR CALL AGAIN LATER. [facility], [facility]

## 2021-09-21 NOTE — TELEPHONE ENCOUNTER
ANASTASIA - PATIENT HAD LABS DRAWN AT [facility] 8/23/2021. THEY REVIEWED THE ORDER AND WERE ONLY GIVEN THE LIPID PANEL ORDER. CMP WAS NOT DRAWN. WOULD YOU LIKE PATIENT TO HAVE THIS DRAWN NOW ?  PLEASE ADVISE

## 2021-09-21 NOTE — PROGRESS NOTES
Vaccine Information Sheet, \"Influenza - Inactivated\"  given to Alena Gutierrez, or parent/legal guardian of  Alena Gutierrez and verbalized understanding. Patient responses:    Have you ever had a reaction to a flu vaccine? No  Do you have any current illness? No  Have you ever had Guillian [place] Syndrome? No  Do you have a serious allergy to any of the follow: Neomycin, Polymyxin, Thimerosal, eggs or egg products? No    Flu vaccine given per order. Please see immunization tab. Risks and benefits explained. Current VIS given.       Immunizations Administered     Name Date Dose Route    Influenza, MDCK Quadv, IM, PF (Flucelvax 2 yrs and older) 9/21/2021 0.5 mL Intramuscular    Site: Deltoid- Left    Lot: [id number]    NDC: 96172-940-17    Tdap (Boostrix, Adacel) 9/21/2021 0.5 mL Intramuscular    Site: Right arm    Lot: [id number]    NDC: 15333-011-04

## 2021-09-21 NOTE — PATIENT INSTRUCTIONS
Patient Education        Anxiety Disorder: Care Instructions  Your Care Instructions     Anxiety is a normal reaction to stress. Difficult situations can cause you to have symptoms such as sweaty palms and a nervous feeling. In an anxiety disorder, the symptoms are far more severe. Constant worry, muscle tension, trouble sleeping, nausea and diarrhea, and other symptoms can make normal daily activities difficult or impossible. These symptoms may occur for no reason, and they can affect your work, school, or social life. Medicines, counseling, and self-care can all help. Follow-up care is a key part of your treatment and safety. Be sure to make and go to all appointments, and call your doctor if you are having problems. It's also a good idea to know your test results and keep a list of the medicines you take. How can you care for yourself at home? · Take medicines exactly as directed. Call your doctor if you think you are having a problem with your medicine. · Go to your counseling sessions and follow-up appointments. · Recognize and accept your anxiety. Then, when you are in a situation that makes you anxious, say to yourself, \"This is not an emergency. I feel uncomfortable, but I am not in danger. I can keep going even if I feel anxious. \"  · Be kind to your body:  ? Relieve tension with exercise or a massage. ? Get enough rest.  ? Avoid alcohol, caffeine, nicotine, and illegal drugs. They can increase your anxiety level and cause sleep problems. ? Learn and do relaxation techniques. See below for more about these techniques. · Engage your mind. Get out and do something you enjoy. Go to a funny movie, or take a walk or hike. Plan your day. Having too much or too little to do can make you anxious. · Keep a record of your symptoms. Discuss your fears with a good friend or family member, or join a support group for people with similar problems. Talking to others sometimes relieves stress.   · Get involved in social groups, or volunteer to help others. Being alone sometimes makes things seem worse than they are. · Get at least 30 minutes of exercise on most days of the week to relieve stress. Walking is a good choice. You also may want to do other activities, such as running, swimming, cycling, or playing tennis or team sports. Relaxation techniques  Do relaxation exercises 10 to 20 minutes a day. You can play soothing, relaxing music while you do them, if you wish. · Tell others in your house that you are going to do your relaxation exercises. Ask them not to disturb you. · Find a comfortable place, away from all distractions and noise. · Lie down on your back, or sit with your back straight. · Focus on your breathing. Make it slow and steady. · Breathe in through your nose. Breathe out through either your nose or mouth. · Breathe deeply, filling up the area between your navel and your rib cage. Breathe so that your belly goes up and down. · Do not hold your breath. · Breathe like this for 5 to 10 minutes. Notice the feeling of calmness throughout your whole body. As you continue to breathe slowly and deeply, relax by doing the following for another 5 to 10 minutes:  · Tighten and relax each muscle group in your body. You can begin at your toes and work your way up to your head. · Imagine your muscle groups relaxing and becoming heavy. · Empty your mind of all thoughts. · Let yourself relax more and more deeply. · Become aware of the state of calmness that surrounds you. · When your relaxation time is over, you can bring yourself back to alertness by moving your fingers and toes and then your hands and feet and then stretching and moving your entire body. Sometimes people fall asleep during relaxation, but they usually wake up shortly afterward. · Always give yourself time to return to full alertness before you drive a car or do anything that might cause an accident if you are not fully alert.  Never play a relaxation tape while you drive a car. When should you call for help? Call 911 anytime you think you may need emergency care. For example, call if:    · You feel you cannot stop from hurting yourself or someone else. Keep the numbers for these national suicide hotlines: 9-848-535-TALK (5-509.211.6728) and 9-247-XGMSCBN (4-855.247.6795). If you or someone you know talks about suicide or feeling hopeless, get help right away. Watch closely for changes in your health, and be sure to contact your doctor if:    · You have anxiety or fear that affects your life.     · You have symptoms of anxiety that are new or different from those you had before. Where can you learn more? Go to https://Fangjia.compeNaldoeb.Key Travel. org and sign in to your [employer] account. Enter P754 in the My Dog Bowl box to learn more about \"Anxiety Disorder: Care Instructions. \"     If you do not have an account, please click on the \"Sign Up Now\" link. Current as of: September 23, 2020               Content Version: 12.9  © 2006-2021 [employer]. Care instructions adapted under license by [facility] ([facility]). If you have questions about a medical condition or this instruction, always ask your healthcare professional. [place] [address] any warranty or liability for your use of this information. Patient Education        Well Visit, Women 48 to 72: Care Instructions  Overview     Well visits can help you stay healthy. Your doctor has checked your overall health and may have suggested ways to take good care of yourself. Your doctor also may have recommended tests. At home, you can help prevent illness with healthy eating, regular exercise, and other steps. Follow-up care is a key part of your treatment and safety. Be sure to make and go to all appointments, and call your doctor if you are having problems.  It's also a good idea to know your test results and keep a list of the medicines you take.  How can you care for yourself at home? · Get screening tests that you and your doctor decide on. Screening helps find diseases before any symptoms appear. · Eat healthy foods. Choose fruits, vegetables, whole grains, protein, and low-fat dairy foods. Limit fat, especially saturated fat. Reduce salt in your diet. · Limit alcohol. Have no more than 1 drink a day or 7 drinks a week. · Get at least 30 minutes of exercise on most days of the week. Walking is a good choice. You also may want to do other activities, such as running, swimming, cycling, or playing tennis or team sports. · Reach and stay at a healthy weight. This will lower your risk for many problems, such as obesity, diabetes, heart disease, and high blood pressure. · Do not smoke. Smoking can make health problems worse. If you need help quitting, talk to your doctor about stop-smoking programs and medicines. These can increase your chances of quitting for good. · Care for your mental health. It is easy to get weighed down by worry and stress. Learn strategies to manage stress, like deep breathing and mindfulness, and stay connected with your family and community. If you find you often feel sad or hopeless, talk with your doctor. Treatment can help. · Talk to your doctor about whether you have any risk factors for sexually transmitted infections (STIs). You can help prevent STIs if you wait to have sex with a new partner (or partners) until you've each been tested for STIs. It also helps if you use condoms (male or female condoms) and if you limit your sex partners to one person who only has sex with you. Vaccines are available for some STIs. · If you think you may have a problem with alcohol or drug use, talk to your doctor. This includes prescription medicines (such as amphetamines and opioids) and illegal drugs (such as cocaine and methamphetamine). Your doctor can help you figure out what type of treatment is best for you.   · Protect your skin from too much sun. When you're outdoors from 10 a.m. to 4 p.m., stay in the shade or cover up with clothing and a hat with a wide brim. Wear sunglasses that block UV rays. Even when it's cloudy, put broad-spectrum sunscreen (SPF 30 or higher) on any exposed skin. · See a dentist one or two times a year for checkups and to have your teeth cleaned. · Wear a seat belt in the car. When should you call for help? Watch closely for changes in your health, and be sure to contact your doctor if you have any problems or symptoms that concern you. Where can you learn more? Go to https://ApaceWave Technologiespepiceweb.healthProperty Moose. org and sign in to your [facility] account. Enter M707 in the GruupMeet box to learn more about \"Well Visit, Women 50 to 72: Care Instructions. \"     If you do not have an account, please click on the \"Sign Up Now\" link. Current as of: May 27, 2020               Content Version: 12.9  © 0362-5739 Health[employer]. Care instructions adapted under license by [facility] ([facility]). If you have questions about a medical condition or this instruction, always ask your healthcare professional. [address] any warranty or liability for your use of this information.

## 2021-09-21 NOTE — LETTER
CONTROLLED SUBSTANCE MEDICATION AGREEMENT     Patient Name: Rasheeda Bond  Patient YOB: 1955   I understand, that controlled substance medications may be used to help better manage my symptoms and to improve my ability to function at home, work and in social settings. However, I also understand that these medications do have risks, which have been discussed with me, including possible development of physical or psychological dependence. I understand that successful treatment requires mutual trust and honesty between me and my provider. I understand and agree that following this Medication Agreement is necessary in continuing my provider-patient relationship and the success of my treatment plan. Explanation from my Provider: Benefits and Goals of Controlled Substance Medications: There are two potential goals for your treatment: (1) decreased pain and suffering (2) improved daily life functions. There are many possible treatments for your chronic condition(s). Alternatives such as physical therapy, yoga, massage, home daily exercise, meditation, relaxation techniques, injections, chiropractic manipulations, surgery, cognitive therapy, hypnosis and many medications that are not habit-forming may be used. Use of controlled substance medications may be helpful, but they are unlikely to resolve all symptoms or restore all function. Explanation from my Provider: Risks of Controlled Substance Medications:  Opioid pain medications: These medications can lead to problems such as addiction/dependence, sedation, lightheadedness/dizziness, memory issues, falls, constipation, nausea, or vomiting. They may also impair the ability to drive or operate machinery. Additionally, these medications may lower testosterone levels, leading to loss of bone strength, stamina and sex drive.   They may cause problems with breathing, sleep apnea and reduced coughing, which is especially dangerous for patients with lung disease. Overdose or dangerous interactions with alcohol and other medications may occur, leading to death. Hyperalgesia may develop, which means patients receiving opioids for the treatment of pain may become more sensitive to certain painful stimuli, and in some cases, experience pain from ordinarily non-painful stimuli. Women between the ages of 14-53 who could become pregnant should carefully weigh the risks and benefits of opioids with their physicians, as these medications increase the risk of pregnancy complications, including miscarriage,  delivery and stillbirth. It is also possible for babies to be born addicted to opioids. Opioid dependence withdrawal symptoms may include; feelings of uneasiness, increased pain, irritability, belly pain, diarrhea, sweats and goose-flesh. Benzodiazepines and non-benzodiazepine sleep medications: These medications can lead to problems such as addiction/dependence, sedation, fatigue, lightheadedness, dizziness, incoordination, falls, depression, hallucinations, and impaired judgment, memory and concentration. The ability to drive and operate machinery may also be affected. Abnormal sleep-related behaviors have been reported, including sleepwalking, driving, making telephone calls, eating, or having sex while not fully awake. These medications can suppress breathing and worsen sleep apnea, particularly when combined with alcohol or other sedating medications, potentially leading to death. Dependence withdrawal symptoms may include tremors, anxiety, hallucinations and seizures. Stimulants:  Common adverse effects include addiction/dependence, increased blood  pressure and heart rate, decreased appetite, nausea, involuntary weight loss, insomnia,                                                                                                                     Initials:_______   irritability, and headaches.   These risks may increase when these medications are combined with other stimulants, such as caffeine pills or energy drinks, certain weight loss supplements and oral decongestants. Dependence withdrawal symptoms may include depressed mood, loss of interest, suicidal thoughts, anxiety, fatigue, appetite changes and agitation. Testosterone replacement therapy:  Potential side effects include increased risk of stroke and heart attack, blood clots, increased blood pressure, increased cholesterol, enlarged prostate, sleep apnea, irritability/aggression and other mood disorders, and decreased fertility. I agree and understand that I and my prescriber have the following rights and responsibilities regarding my treatment plan:     1. MY RIGHTS:  To be informed of my treatment and medication plan. To be an active participant in my health and wellbeing. 2. MY RESPONSIBILITY AND UNDERSTANDING FOR USE OF MEDICATIONS   I will take medications at the dose and frequency as directed. For my safety, I will not increase or change how I take my medications without the recommendation of my healthcare provider.  I will actively participate in any program recommended by my provider which may improve function, including social, physical, psychological programs.  I will not take my medications with alcohol or other drugs not prescribed to me. I understand that drinking alcohol with my medications increases the chances of side effects, including reduced breathing rate and could lead to personal injury when operating machinery.  I understand that if I have a history of substance use disorders, including alcohol or other illicit drugs, that I may be at increased risk of addiction to my medications.  I agree to notify my provider immediately if I should become pregnant so that my treatment plan can be adjusted.    I agree and understand that I shall only receive controlled substance medications from the prescriber that signed this agreement unless there is written agreement among other prescribers of controlled substances outlining the responsibility of the medications being prescribed.  I understand that the if the controlled medication is not helping to achieve goals, the dosage may be tapered and no longer prescribed. 3. MY RESPONSIBILITY FOR COMMUNICATION / PRESCRIPTION RENEWALS   I agree that all controlled substance medications that I take will be prescribed only by my provider. If another healthcare provider prescribes me medication in an emergency, I will notify my provider within seventy-two (72) hours.  I will arrange for refills at the prescribed interval ONLY during regular office hours. I will not ask for refills earlier than agreed, after-hours, on holidays or weekends. Refills may take up to 72 hours for processing and prescriptions to reach the pharmacy.  I will inform my other health care providers that I am taking these medications and of the existence of this [address]. In the event of an emergency, I will provide the same information to the emergency department prescribers.  I will keep my provider updated on the pharmacy I am using for controlled medication prescription filling. Initials:_______  4. MY RESPONSIBILITY FOR PROTECTING MEDICATIONS   I will protect my prescriptions and medications. I understand that lost or misplaced prescriptions will not be replaced.  I will keep medications only for my own use and will not share them with others. I will keep all medications away from children.  I agree that if my medications are adjusted or discontinued, I will properly dispose of any remaining medications. I understand that I will be required to dispose of any remaining controlled medications as, directed by my prescriber, prior to being provided with any prescriptions for other controlled medications.   Medication drop box locations can be found at: HitProtect.dk    5. MY RESPONSIBILITY WITH ILLEGAL DRUGS    I will not use illegal or street drugs or another person's prescription medications not prescribed to me.  If there are identified addiction type symptoms, then referral to a program may be provided by my provider and I agree to follow through with this recommendation. 6. MY RESPONSIBILITY FOR COOPERATION WITH INVESTIGATIONS   I understand that my provider will comply with any applicable law and may discuss my use and/or possible misuse/abuse of controlled substances and alcohol, as appropriate, with any health care provider involved in my care, pharmacist, or legal authority.  I authorize my provider and pharmacy to cooperate fully with law enforcement agencies (as permitted by law) in the investigation of any possible misuse, sale, or other diversion of my controlled substances.  I agree to waive any applicable privilege or right of privacy or confidentiality with respect to these authorizations. 7. PROVIDERS RIGHT TO MONITOR FOR SAFETY: PRESCRIPTION MONITORING / DRUG TESTING   I consent to drug/toxicology screening and will submit to a drug screen upon my providers request to assure I am only taking the prescribed drugs for my safety monitoring. I understand that a drug screen is a laboratory test in which a sample of my urine, blood or saliva is checked to see what drugs I have been taking. This may entail an observed urine specimen, which means that a nurse or other health care provider may watch me provide urine, and I will cooperate if I am asked to provide an observed specimen.  I understand that my provider will check a copy of my State Prescription Monitoring Program () Report in order to safely prescribe medications.  Pill Counts: I consent to pill counts when requested.   I may be asked to bring all my prescribed controlled substance medications, in their original bottles, to all of my scheduled appointments. In addition, my provider may ask me to come to the practice at any time for a random pill count. 8. TERMINATION OF THIS AGREEMENT  For my safety, my prescriber has the right to stop prescribing controlled substance medications and may end this agreement. Initials:_______   Conditions that may result in termination of this agreement:  a. I do not show any improvement in pain, or my activity has not improved. b. I develop rapid tolerance or loss of improvement, as described in my treatment plan.  c. I develop significant side effects from the medication. d. My behavior is not consistent with the responsibilities outlined above, thereby causing safety concerns to continue prescribing controlled substance medications. e. I fail to follow the terms of this agreement. f. Other:____________________________       UNDERSTANDING THIS MEDICATION AGREEMENT:    I have read the above and have had all my questions answered. For chronic disease management, I know that my symptoms can be managed with many types of treatments. A chronic medication trial may be part of my treatment, but I must be an active participant in my care. Medication therapy is only one part of my symptom management plan. In some cases, there may be limited scientific evidence to support the chronic use of certain medications to improve symptoms and daily function. Furthermore, in certain circumstances, there may be scientific information that suggests that the use of chronic controlled substances may worsen my symptoms and increase my risk of unintentional death directly related to this medication therapy. I know that if my provider feels my risk from controlled medications is greater than my benefit, I will have my controlled substance medication(s) compassionately lowered or removed altogether.      I further agree to allow this office to

## 2021-09-22 NOTE — TELEPHONE ENCOUNTER
[phone] SPOKE TO PATIENT AND SHE WILL HAVE REDRAWN AT [facility] AT [address] [facility] FOR PATIENT TO

## 2021-10-08 LAB
A/G RATIO: 1.8 (CALC) (ref 1–2.5)
ALBUMIN SERPL-MCNC: 4.5 G/DL (ref 3.6–5.1)
ALP BLD-CCNC: 85 U/L (ref 37–153)
ALT SERPL-CCNC: 20 U/L (ref 6–29)
AST SERPL-CCNC: 18 U/L (ref 10–35)
BILIRUB SERPL-MCNC: 0.7 MG/DL (ref 0.2–1.2)
BUN / CREAT RATIO: NORMAL (CALC) (ref 6–22)
BUN BLDV-MCNC: 14 MG/DL (ref 7–25)
CALCIUM SERPL-MCNC: 9.5 MG/DL (ref 8.6–10.4)
CHLORIDE BLD-SCNC: 102 MMOL/L (ref 98–110)
CO2: 29 MMOL/L (ref 20–32)
COPY TO: NORMAL
CREAT SERPL-MCNC: 0.7 MG/DL (ref 0.5–0.99)
GFR AFRICAN AMERICAN: 106 ML/MIN/1.73M2
GFR NON-AFRICAN AMERICAN: 92 ML/MIN/1.73M2
GLOBULIN: 2.5 G/DL (CALC) (ref 1.9–3.7)
GLUCOSE BLD-MCNC: 82 MG/DL (ref 65–99)
POTASSIUM SERPL-SCNC: 4 MMOL/L (ref 3.5–5.3)
SODIUM BLD-SCNC: 138 MMOL/L (ref 135–146)
TOTAL PROTEIN: 7 G/DL (ref 6.1–8.1)

## 2021-11-16 ENCOUNTER — PATIENT MESSAGE (OUTPATIENT)
Dept: FAMILY MEDICINE CLINIC | Age: 66
End: 2021-11-16

## 2021-11-16 NOTE — TELEPHONE ENCOUNTER
From: Ignacio Toney  To: Laurence Tiwari  Sent: 11/16/2021 8:21 AM EST  Subject: Non-Urgent Medical Question    Good morning,     I am looking at [facility] More plans and was told your office is in a plan I was signing up for. The plan is [place] [employer] Access PPO. Please confirm I can still be seen at this office with this plan.     Thank you,     Sondra Vides

## 2021-12-02 DIAGNOSIS — E78.5 HYPERLIPIDEMIA, UNSPECIFIED HYPERLIPIDEMIA TYPE: ICD-10-CM

## 2021-12-02 RX ORDER — ATORVASTATIN CALCIUM 10 MG/1
TABLET, FILM COATED ORAL
Qty: 90 TABLET | Refills: 0 | Status: SHIPPED | OUTPATIENT
Start: 2021-12-02 | End: 2022-05-23

## 2022-01-03 ENCOUNTER — PATIENT MESSAGE (OUTPATIENT)
Dept: FAMILY MEDICINE CLINIC | Age: 67
End: 2022-01-03

## 2022-01-03 NOTE — TELEPHONE ENCOUNTER
From: Peg Cohen  To: Aram Julian  Sent: 1/3/2022 8:44 AM EST  Subject: Covid vaccine     Please update my records. 1st vaccine Pfizer 2/10/21  2nd dose Pfizer 3/13/21  booster Pfizer 12/14/21    Mammogram and pap on 12/29/21    Also, can I get a refill on the Alprazolam medication. I have had trouble sleeping. I wake up because I'm worried about my elderly 80year old mom who has not been doing well lately.      Thank you,    Harvey Juan

## 2022-01-04 ENCOUNTER — E-VISIT (OUTPATIENT)
Dept: FAMILY MEDICINE CLINIC | Age: 67
End: 2022-01-04
Payer: COMMERCIAL

## 2022-01-04 DIAGNOSIS — F51.04 PSYCHOPHYSIOLOGICAL INSOMNIA: ICD-10-CM

## 2022-01-04 DIAGNOSIS — F41.9 ANXIETY: ICD-10-CM

## 2022-01-04 PROCEDURE — 99422 OL DIG E/M SVC 11-20 MIN: CPT | Performed by: NURSE PRACTITIONER

## 2022-01-04 RX ORDER — ALPRAZOLAM 0.5 MG/1
0.5 TABLET ORAL 4 TIMES DAILY PRN
Qty: 42 TABLET | Refills: 0 | Status: SHIPPED | OUTPATIENT
Start: 2022-01-04 | End: 2022-02-03

## 2022-03-10 NOTE — PROGRESS NOTES
I SPOKE TO PT, SHE HAD HER MAMMOGRAM  [address] [phone]. I CALLED AND THEY ARE FAXING THE RESULT.   [address] [facility]

## 2022-05-22 DIAGNOSIS — E78.5 HYPERLIPIDEMIA, UNSPECIFIED HYPERLIPIDEMIA TYPE: ICD-10-CM

## 2022-05-23 RX ORDER — ATORVASTATIN CALCIUM 10 MG/1
TABLET, FILM COATED ORAL
Qty: 90 TABLET | Refills: 0 | Status: SHIPPED | OUTPATIENT
Start: 2022-05-23 | End: 2022-08-22

## 2022-07-25 ENCOUNTER — OFFICE VISIT (OUTPATIENT)
Dept: FAMILY MEDICINE CLINIC | Age: 67
End: 2022-07-25
Payer: MEDICARE

## 2022-07-25 VITALS
SYSTOLIC BLOOD PRESSURE: 138 MMHG | TEMPERATURE: 98 F | OXYGEN SATURATION: 94 % | HEART RATE: 72 BPM | WEIGHT: 149 LBS | RESPIRATION RATE: 20 BRPM | BODY MASS INDEX: 26.4 KG/M2 | DIASTOLIC BLOOD PRESSURE: 82 MMHG | HEIGHT: 63 IN

## 2022-07-25 DIAGNOSIS — E78.00 HYPERCHOLESTEREMIA: ICD-10-CM

## 2022-07-25 DIAGNOSIS — Z78.0 ENCOUNTER FOR OSTEOPOROSIS SCREENING IN ASYMPTOMATIC POSTMENOPAUSAL PATIENT: ICD-10-CM

## 2022-07-25 DIAGNOSIS — Z13.1 DIABETES MELLITUS SCREENING: ICD-10-CM

## 2022-07-25 DIAGNOSIS — Z00.00 WELCOME TO MEDICARE PREVENTIVE VISIT: Primary | ICD-10-CM

## 2022-07-25 DIAGNOSIS — Z13.820 ENCOUNTER FOR OSTEOPOROSIS SCREENING IN ASYMPTOMATIC POSTMENOPAUSAL PATIENT: ICD-10-CM

## 2022-07-25 DIAGNOSIS — Z23 NEED FOR 23-POLYVALENT PNEUMOCOCCAL POLYSACCHARIDE VACCINE: ICD-10-CM

## 2022-07-25 PROCEDURE — 1123F ACP DISCUSS/DSCN MKR DOCD: CPT | Performed by: NURSE PRACTITIONER

## 2022-07-25 PROCEDURE — 90732 PPSV23 VACC 2 YRS+ SUBQ/IM: CPT | Performed by: NURSE PRACTITIONER

## 2022-07-25 PROCEDURE — G0009 ADMIN PNEUMOCOCCAL VACCINE: HCPCS | Performed by: NURSE PRACTITIONER

## 2022-07-25 PROCEDURE — G0402 INITIAL PREVENTIVE EXAM: HCPCS | Performed by: NURSE PRACTITIONER

## 2022-07-25 RX ORDER — MECLIZINE HYDROCHLORIDE 25 MG/1
25 TABLET ORAL 3 TIMES DAILY PRN
Qty: 30 TABLET | Refills: 0 | Status: SHIPPED | OUTPATIENT
Start: 2022-07-25 | End: 2022-08-04

## 2022-07-25 ASSESSMENT — PATIENT HEALTH QUESTIONNAIRE - PHQ9
1. LITTLE INTEREST OR PLEASURE IN DOING THINGS: 0
SUM OF ALL RESPONSES TO PHQ9 QUESTIONS 1 & 2: 0
SUM OF ALL RESPONSES TO PHQ QUESTIONS 1-9: 0
1. LITTLE INTEREST OR PLEASURE IN DOING THINGS: 0
2. FEELING DOWN, DEPRESSED OR HOPELESS: 0
SUM OF ALL RESPONSES TO PHQ QUESTIONS 1-9: 0
SUM OF ALL RESPONSES TO PHQ9 QUESTIONS 1 & 2: 0
2. FEELING DOWN, DEPRESSED OR HOPELESS: 0

## 2022-07-25 NOTE — PATIENT INSTRUCTIONS
Personalized Preventive Plan for Pasha Munoz - 7/25/2022  Medicare offers a range of preventive health benefits. Some of the tests and screenings are paid in full while other may be subject to a deductible, co-insurance, and/or copay. Some of these benefits include a comprehensive review of your medical history including lifestyle, illnesses that may run in your family, and various assessments and screenings as appropriate. After reviewing your medical record and screening and assessments performed today your provider may have ordered immunizations, labs, imaging, and/or referrals for you. A list of these orders (if applicable) as well as your Preventive Care list are included within your After Visit Summary for your review. Other Preventive Recommendations:    A preventive eye exam performed by an eye specialist is recommended every 1-2 years to screen for glaucoma; cataracts, macular degeneration, and other eye disorders. A preventive dental visit is recommended every 6 months. Try to get at least 150 minutes of exercise per week or 10,000 steps per day on a pedometer . Order or download the FREE \"Exercise & Physical Activity: Your Everyday Guide\" from [employer]. Call 7-358.145.9963 or search [employer] online. You need 2766-6609 mg of calcium and 9659-9926 IU of vitamin D per day. It is possible to meet your calcium requirement with diet alone, but a vitamin D supplement is usually necessary to meet this goal.  When exposed to the sun, use a sunscreen that protects against both UVA and UVB radiation with an SPF of 30 or greater. Reapply every 2 to 3 hours or after sweating, drying off with a towel, or swimming. Always wear a seat belt when traveling in a car. Always wear a helmet when riding a bicycle or motorcycle.

## 2022-07-25 NOTE — PROGRESS NOTES
Medicare Annual Wellness Visit    CLINT Lezama is here for Medicare AWV (annual)    Assessment & Plan   Welcome to Medicare preventive visit    Recommendations for Preventive Services Due: see orders and patient instructions/AVS.  Recommended screening schedule for the next 5-10 years is provided to the patient in written form: see Patient Instructions/AVS.     Return for Medicare Annual Wellness Visit in 1 year. Subjective       Patient's complete Health Risk Assessment and screening values have been reviewed and are found in Flowsheets. The following problems were reviewed today and where indicated follow up appointments were made and/or referrals ordered. Positive Risk Factor Screenings with Interventions:             General Health and ACP:  General  In general, how would you say your health is?: Excellent  In the past 7 days, have you experienced any of the following: New or Increased Pain, New or Increased Fatigue, Loneliness, Social Isolation, Stress or Anger?: (!) Yes  Select all that apply: (!) Stress she is going on month long  trip  Do you get the social and emotional support that you need?: Yes  Do you have a Living Will?: Yes    Advance Directives       Power of  Living Will ACP-Advance Directive ACP-Power of     Not on File Not on File Not on File Not on File          General Health Risk Interventions:  Stress: patient declines any further evaluation/treatment for this issue, the stress is due to the trip     Hearing/Vision:  Do you or your family notice any trouble with your hearing that hasn't been managed with hearing aids?: (!) Yes- she has long term hearing loss does not wear her hearing aid   Do you have difficulty driving, watching TV, or doing any of your daily activities because of your eyesight?: No  Have you had an eye exam within the past year?: Yes  No results found.   Hearing/Vision Interventions:  Hearing concerns:  patient declines any further evaluation/treatment for hearing issues  Vision concerns:  eye exam within th last year    Safety:  Do you have working smoke detectors?: Yes  Do you have any tripping hazards - loose or unsecured carpets or rugs?: No  Do you have any tripping hazards - clutter in doorways, halls, or stairs?: No  Do you have either shower bars, grab bars, non-slip mats or non-slip surfaces in your shower or bathtub?: (!) No  Do all of your stairways have a railing or banister?: Yes  Do you always fasten your seatbelt when you are in a car?: Yes  Safety Interventions:  Home safety tips provided  Patient declines any further evaluation/treatment for this issue           Objective   Vitals:    07/25/22 1529 07/25/22 1545   BP: (!) 140/80 138/82   Site: Right Upper Arm Right Upper Arm   Position: Sitting Sitting   Cuff Size: Medium Adult Medium Adult   Pulse: 72    Resp: 20    Temp: 98 °F (36.7 °C)    TempSrc: Temporal    SpO2: 94%    Weight: 149 lb (67.6 kg)    Height: 5' 3\" (1.6 m)       Body mass index is 26.39 kg/m².       General Appearance: alert and oriented to person, place and time, well developed and well- nourished, in no acute distress  Skin: warm and dry, no rash or erythema  Head: normocephalic and atraumatic  Eyes: pupils equal, round, and reactive to light, extraocular eye movements intact, conjunctivae normal  ENT: tympanic membrane, external ear and ear canal normal bilaterally, nose without deformity, nasal mucosa and turbinates normal without polyps  Neck: supple and non-tender without mass, no thyromegaly or thyroid nodules, no cervical lymphadenopathy  Pulmonary/Chest: clear to auscultation bilaterally- no wheezes, rales or rhonchi, normal air movement, no respiratory distress  Cardiovascular: normal rate, regular rhythm, normal S1 and S2, no murmurs, rubs, clicks, or gallops, distal pulses intact, no carotid bruits  Abdomen: soft, non-tender, non-distended, normal bowel sounds, no masses or organomegaly  Extremities: no cyanosis, clubbing or edema  Musculoskeletal: normal range of motion, no joint swelling, deformity or tenderness  Neurologic: reflexes normal and symmetric, no cranial nerve deficit, gait, coordination and speech normal  General Appearance: alert and oriented to person, place and time, well-developed and well-nourished, in no acute distress  Skin: warm and dry, no rash or erythema  Pulmonary/Chest: clear to auscultation bilaterally- no wheezes, rales or rhonchi, normal air movement, no respiratory distress  Cardiovascular: normal rate, normal S1 and S2, and no carotid bruits  Musculoskeletal: normal range of motion, no joint swelling, deformity or tenderness  Neurologic: gait and coordination normal and speech normal       Allergies   Allergen Reactions    Ancef [Cefazolin]     Codeine     Cyclobenzaprine     Erythromycin     Sulfa Antibiotics     Sulfamethazine      Prior to Visit Medications    Medication Sig Taking? Authorizing Provider   atorvastatin (LIPITOR) 10 MG tablet TAKE 1 TABLET BY MOUTH EVERY DAY Yes RADAMES López - CNP   vitamin D 25 MCG (1000 UT) CAPS Take 1 capsule by mouth daily Yes Historical Provider, MD   Bioflavonoid Products (VITAMIN C) CHEW Take 2 tablets by mouth daily Yes Historical Provider, MD   Cyanocobalamin (B-12) 1000 MCG TABS Take 2 tablets by mouth daily Yes Historical Provider, MD   ALPRAZolam (XANAX) 0.5 MG tablet Take 0.5 mg by mouth nightly as needed for Sleep.  Yes Historical Provider, MD   Multiple Vitamins-Minerals (THERAPEUTIC MULTIVITAMIN-MINERALS) tablet Take 1 tablet by mouth daily Yes Historical Provider, MD   Omega-3 Fatty Acids (FISH OIL PO) Take by mouth Yes Historical Provider, MD Tapia (Including outside providers/suppliers regularly involved in providing care):   Patient Care Team:  Leonie Foreman MD as PCP - General (Family Medicine)  Leonie Foreman MD as PCP - [facility] Empaneled Provider     Reviewed and updated this visit:  Nelsy Ortega was seen today for medicare awv. Diagnoses and all orders for this visit:    Welcome to Medicare preventive visit  - Reviewed medical and social history together. Discussed wide range of preventive recommendations for Medicare population, including fall prevention, exercise, recommendations for healthy produce-based diet, vaccines and routine screening tests, addressing all care gaps. Encounter for osteoporosis screening in asymptomatic postmenopausal patient  -     DEXA BONE DENSITY AXIAL SKELETON; Future    Diabetes mellitus screening    -     Comprehensive Metabolic Panel; Future    Hypercholesteremia    -     Lipid, Fasting; Future    Need for 23-polyvalent pneumococcal polysaccharide vaccine  -     Pneumococcal, PPSV23, PNEUMOVAX 23, (age 2 yrs+), SC/IM    Other orders  -     meclizine (ANTIVERT) 25 MG tablet;  Take 1 tablet by mouth 3 times daily as needed for Dizziness or Nausea

## 2022-08-04 ENCOUNTER — OFFICE VISIT (OUTPATIENT)
Dept: FAMILY MEDICINE CLINIC | Age: 67
End: 2022-08-04
Payer: MEDICARE

## 2022-08-04 VITALS
TEMPERATURE: 98 F | RESPIRATION RATE: 20 BRPM | DIASTOLIC BLOOD PRESSURE: 82 MMHG | WEIGHT: 145 LBS | SYSTOLIC BLOOD PRESSURE: 122 MMHG | BODY MASS INDEX: 25.69 KG/M2 | HEIGHT: 63 IN

## 2022-08-04 DIAGNOSIS — H60.501 ACUTE OTITIS EXTERNA OF RIGHT EAR, UNSPECIFIED TYPE: Primary | ICD-10-CM

## 2022-08-04 PROCEDURE — 1123F ACP DISCUSS/DSCN MKR DOCD: CPT | Performed by: NURSE PRACTITIONER

## 2022-08-04 PROCEDURE — 99213 OFFICE O/P EST LOW 20 MIN: CPT | Performed by: NURSE PRACTITIONER

## 2022-08-04 RX ORDER — HYDROCORTISONE AND ACETIC ACID 20.75; 10.375 MG/ML; MG/ML
3 SOLUTION AURICULAR (OTIC) 2 TIMES DAILY
Qty: 10 ML | Refills: 0 | Status: SHIPPED | OUTPATIENT
Start: 2022-08-04 | End: 2022-08-14

## 2022-08-04 NOTE — PROGRESS NOTES
Sully Harvey (:  1955) is a 74 y.o. female,Established patient, here for evaluation of the following chief complaint(s):    Otalgia (Sx lasting couple days right ear)      SUBJECTIVE/OBJECTIVE:  HPI  KATHY C/O RIGHT EAR PAIN FOR THE LAST FEW DAYS - ACHING ON THE INSIDE OF HER EAR FEELS LIKE THERE IS A SCAB ON HE INSIDE HER EAR FEELS FULL  AND CONGESTED  Review of Systems   HENT:  Positive for congestion and ear pain. Physical Exam  Vitals reviewed. Constitutional:       General: She is awake. She is not in acute distress. Appearance: Normal appearance. She is well-developed and well-groomed. She is not ill-appearing, toxic-appearing or diaphoretic. HENT:      Ears:      Comments: RIGHT EAR CANAL SLIGHTLY REDDENED AND SWOLLEN WITH WHITE EXUDATE NOTED  Neurological:      Mental Status: She is alert. Psychiatric:         Attention and Perception: Attention and perception normal.         Mood and Affect: Mood and affect normal.         Speech: Speech normal.         Behavior: Behavior normal. Behavior is cooperative. Thought Content: Thought content normal.         Cognition and Memory: Cognition and memory normal.         Judgment: Judgment normal.       Juan Petersen was seen today for otalgia. Diagnoses and all orders for this visit:    Acute otitis externa of right ear, unspecified type  -     acetic acid-hydrocortisone (VOSOL-HC) 1-2 % otic solution; Place 3 drops into the right ear in the morning and 3 drops before bedtime. Do all this for 10 days. ENCOURAGED TO KEEP EAR DRY      An electronic signature was used to authenticate this note.     --RADAMES Corral - CNP

## 2022-08-22 DIAGNOSIS — E78.5 HYPERLIPIDEMIA, UNSPECIFIED HYPERLIPIDEMIA TYPE: ICD-10-CM

## 2022-08-22 RX ORDER — ATORVASTATIN CALCIUM 10 MG/1
TABLET, FILM COATED ORAL
Qty: 90 TABLET | Refills: 0 | Status: SHIPPED | OUTPATIENT
Start: 2022-08-22 | End: 2022-10-04 | Stop reason: SDUPTHER

## 2022-09-20 ENCOUNTER — PATIENT MESSAGE (OUTPATIENT)
Dept: FAMILY MEDICINE CLINIC | Age: 67
End: 2022-09-20

## 2022-09-20 NOTE — TELEPHONE ENCOUNTER
From: Patrick Bella  To: Sonya Mcfarland  Sent: 9/20/2022 9:18 AM EDT  Subject: Blood work and flu shot    Good morning,    I need to reschedule my annual bloodwork and was hoping to get my flu shot at the same time. I had to cancel the appointment that was set for September 8th. Early Thursday mornings are best for me. If there is an 8:30 am appointment on a Thursday September 29, I would like to schedule the time for at least the bloodwork. If the flu shot is unavailable at that time I would reschedule the flu shot at a later date. Just let me know if September 29th at 8:30 is available for the bloodwork.     Thank you,    Ben Gutiérrez

## 2022-10-04 ENCOUNTER — NURSE ONLY (OUTPATIENT)
Dept: FAMILY MEDICINE CLINIC | Age: 67
End: 2022-10-04
Payer: MEDICARE

## 2022-10-04 DIAGNOSIS — Z23 NEEDS FLU SHOT: Primary | ICD-10-CM

## 2022-10-04 DIAGNOSIS — E78.00 HYPERCHOLESTEREMIA: ICD-10-CM

## 2022-10-04 DIAGNOSIS — Z13.1 DIABETES MELLITUS SCREENING: ICD-10-CM

## 2022-10-04 DIAGNOSIS — E78.5 HYPERLIPIDEMIA, UNSPECIFIED HYPERLIPIDEMIA TYPE: ICD-10-CM

## 2022-10-04 LAB
A/G RATIO: 2.4 (ref 1.1–2.2)
ALBUMIN SERPL-MCNC: 4.6 G/DL (ref 3.4–5)
ALP BLD-CCNC: 85 U/L (ref 40–129)
ALT SERPL-CCNC: 14 U/L (ref 10–40)
ANION GAP SERPL CALCULATED.3IONS-SCNC: 11 MMOL/L (ref 3–16)
AST SERPL-CCNC: 15 U/L (ref 15–37)
BILIRUB SERPL-MCNC: 0.8 MG/DL (ref 0–1)
BUN BLDV-MCNC: 14 MG/DL (ref 7–20)
CALCIUM SERPL-MCNC: 9.1 MG/DL (ref 8.3–10.6)
CHLORIDE BLD-SCNC: 103 MMOL/L (ref 99–110)
CHOLESTEROL, FASTING: 175 MG/DL (ref 0–199)
CO2: 27 MMOL/L (ref 21–32)
CREAT SERPL-MCNC: 0.6 MG/DL (ref 0.6–1.2)
GFR AFRICAN AMERICAN: >60
GFR NON-AFRICAN AMERICAN: >60
GLUCOSE BLD-MCNC: 84 MG/DL (ref 70–99)
HDLC SERPL-MCNC: 56 MG/DL (ref 40–60)
LDL CHOLESTEROL CALCULATED: 93 MG/DL
POTASSIUM SERPL-SCNC: 4.1 MMOL/L (ref 3.5–5.1)
SODIUM BLD-SCNC: 141 MMOL/L (ref 136–145)
TOTAL PROTEIN: 6.5 G/DL (ref 6.4–8.2)
TRIGLYCERIDE, FASTING: 130 MG/DL (ref 0–150)
VLDLC SERPL CALC-MCNC: 26 MG/DL

## 2022-10-04 PROCEDURE — 90694 VACC AIIV4 NO PRSRV 0.5ML IM: CPT | Performed by: NURSE PRACTITIONER

## 2022-10-04 PROCEDURE — G0008 ADMIN INFLUENZA VIRUS VAC: HCPCS | Performed by: NURSE PRACTITIONER

## 2022-10-04 PROCEDURE — 36415 COLL VENOUS BLD VENIPUNCTURE: CPT | Performed by: NURSE PRACTITIONER

## 2022-10-04 RX ORDER — ATORVASTATIN CALCIUM 10 MG/1
TABLET, FILM COATED ORAL
Qty: 90 TABLET | Refills: 3 | Status: SHIPPED | OUTPATIENT
Start: 2022-11-23 | End: 2023-02-22

## 2022-12-17 ENCOUNTER — PATIENT MESSAGE (OUTPATIENT)
Dept: FAMILY MEDICINE CLINIC | Age: 67
End: 2022-12-17

## 2022-12-19 NOTE — TELEPHONE ENCOUNTER
From: Alexa Judd  To: Marilyn Long  Sent: 12/17/2022 1:12 PM EST  Subject: TDap    Hello,  My daughter in law is expecting in Feb. It was suggested for me to get a TDap booster. Have I had this? If not, I would like to schedule to get one. Please let me know. Thanks and Citlalli Fabian!     [facility]

## 2023-02-14 ENCOUNTER — HOSPITAL ENCOUNTER (OUTPATIENT)
Dept: WOMENS IMAGING | Age: 68
Discharge: HOME | End: 2023-02-14
Payer: MEDICARE

## 2023-02-14 DIAGNOSIS — Z13.820 ENCOUNTER FOR OSTEOPOROSIS SCREENING IN ASYMPTOMATIC POSTMENOPAUSAL PATIENT: ICD-10-CM

## 2023-02-14 DIAGNOSIS — Z78.0 ENCOUNTER FOR OSTEOPOROSIS SCREENING IN ASYMPTOMATIC POSTMENOPAUSAL PATIENT: ICD-10-CM

## 2023-02-14 PROCEDURE — 77080 DXA BONE DENSITY AXIAL: CPT

## 2023-07-26 ENCOUNTER — E-VISIT (OUTPATIENT)
Dept: FAMILY MEDICINE CLINIC | Age: 68
End: 2023-07-26
Payer: MEDICARE

## 2023-07-26 DIAGNOSIS — F51.04 PSYCHOPHYSIOLOGICAL INSOMNIA: ICD-10-CM

## 2023-07-26 DIAGNOSIS — F41.9 ANXIETY: Primary | ICD-10-CM

## 2023-07-26 PROCEDURE — 99422 OL DIG E/M SVC 11-20 MIN: CPT | Performed by: NURSE PRACTITIONER

## 2023-07-26 RX ORDER — SCOLOPAMINE TRANSDERMAL SYSTEM 1 MG/1
1 PATCH, EXTENDED RELEASE TRANSDERMAL
Qty: 4 PATCH | Refills: 0 | OUTPATIENT
Start: 2023-07-26

## 2023-07-26 RX ORDER — ALPRAZOLAM 0.5 MG/1
0.5 TABLET ORAL NIGHTLY PRN
OUTPATIENT
Start: 2023-07-26

## 2023-07-26 RX ORDER — ALPRAZOLAM 0.5 MG/1
0.5 TABLET ORAL 4 TIMES DAILY PRN
Qty: 42 TABLET | Refills: 0 | Status: SHIPPED | OUTPATIENT
Start: 2023-07-26 | End: 2023-08-25

## 2023-07-26 NOTE — TELEPHONE ENCOUNTER
----- Message from Olinda Adarshgloria sent at 7/26/2023  9:46 AM EDT -----  Subject: Refill Request    QUESTIONS  Name of Medication? Other - ALPRAZolam (XANAX) 0.5 MG tablet  Patient-reported dosage and instructions? .5 mg Take 1 tablet daily 4x   daily as needed for sleep or anxiety. How many days do you have left? 0  Preferred Pharmacy? [id number]  Pharmacy phone number (if available)? [phone]  Additional Information for Provider? Patient is wanting to see if she   could get this script again for anxiety, worry (she worries about   everyone) and sleeplessness. She is going through a lot with parents being   sick, lost a parent a month ago. She had it before. Wants to know if she   needs to be seen to get this refilled. This was a 30 day refill before.   ---------------------------------------------------------------------------  --------------,  Name of Medication? Other - scopolamine  Patient-reported dosage and instructions? Behind ear  How many days do you have left? 0  Preferred Pharmacy? [id number]  Pharmacy phone number (if available)? [phone]  Additional Information for Provider? Patient is going on a 10-day [employer]   cruise ([facility]) on 9/4 and wants to know if she can get   a patch (scopolamine-put behind ear) for motion sickness to help her. She   had it a long time ago. Please call to advise.   ---------------------------------------------------------------------------  --------------  CALL BACK INFO  What is the best way for the office to contact you? OK to leave message on   voicemail  Preferred Call Back Phone Number? [phone]  ---------------------------------------------------------------------------  --------------  SCRIPT ANSWERS  Relationship to Patient?  Self

## 2023-07-26 NOTE — PROGRESS NOTES
11-20 minutes were spent on the digital evaluation and management of this patient.   OARRS CONSISTENT-- LAST FILLED 1/22

## 2023-08-04 ENCOUNTER — TELEPHONE (OUTPATIENT)
Dept: FAMILY MEDICINE CLINIC | Age: 68
End: 2023-08-04

## 2023-08-04 NOTE — TELEPHONE ENCOUNTER
Patient is going on a cruise on 9/4/23 and she needs a RX for motion sickness - patches. Please give her a call back. [facility]- [address] - phone no. [phone]    She did request this on her last evisit but no order was placed.

## 2023-08-04 NOTE — TELEPHONE ENCOUNTER
[facility] Staff (supporting Mc Castaneda, APRN - EMERALD)           5:12 PM  Kemi Mcdonough,  I tried to schedule an e-visit. When it came to the second questionnaire, it said I should schedule an appointment. I explained to the nurse this morning that I have been have trouble sleeping because I have sick parents and also my son is having marital problems and I can't sleep because of worrying. I lost my mother a couple months ago as well. I am sad and worried. I feel safe at home so there is no concern there. I can not help how I feel and at night and sometimes during the day I have some anxiety worrying about things I cannot change. That is why I am asking for the refill of Alprazolam.  This medication seemed to help me. I am also going on a cruise on September 4th. The last time I went on a cruise I got some kind of patch so I would not get motion sickness. I am also asking for a patch or something that will help prevent me from getting sick. Please advise as to what I need to do.       Thanks,     Ruiz Kothari [phone]

## 2023-08-09 RX ORDER — SCOLOPAMINE TRANSDERMAL SYSTEM 1 MG/1
1 PATCH, EXTENDED RELEASE TRANSDERMAL
Qty: 4 PATCH | Refills: 0 | Status: SHIPPED | OUTPATIENT
Start: 2023-08-09 | End: 2023-09-21 | Stop reason: ALTCHOICE

## 2023-09-18 ENCOUNTER — PATIENT MESSAGE (OUTPATIENT)
Dept: FAMILY MEDICINE CLINIC | Age: 68
End: 2023-09-18

## 2023-09-18 NOTE — TELEPHONE ENCOUNTER
From: Praneeth Carter  To: Chavo Conley  Sent: 9/18/2023 2:10 PM EDT  Subject: visit for 9/21    Good afternoon,  Do I have to fast for any blood work that will need to be done for my medicare physical?    Thanks  Florina Angel

## 2023-09-20 SDOH — HEALTH STABILITY: PHYSICAL HEALTH: ON AVERAGE, HOW MANY MINUTES DO YOU ENGAGE IN EXERCISE AT THIS LEVEL?: 60 MIN

## 2023-09-20 SDOH — ECONOMIC STABILITY: INCOME INSECURITY: HOW HARD IS IT FOR YOU TO PAY FOR THE VERY BASICS LIKE FOOD, HOUSING, MEDICAL CARE, AND HEATING?: NOT HARD AT ALL

## 2023-09-20 SDOH — ECONOMIC STABILITY: FOOD INSECURITY: WITHIN THE PAST 12 MONTHS, THE FOOD YOU BOUGHT JUST DIDN'T LAST AND YOU DIDN'T HAVE MONEY TO GET MORE.: NEVER TRUE

## 2023-09-20 SDOH — ECONOMIC STABILITY: FOOD INSECURITY: WITHIN THE PAST 12 MONTHS, YOU WORRIED THAT YOUR FOOD WOULD RUN OUT BEFORE YOU GOT MONEY TO BUY MORE.: NEVER TRUE

## 2023-09-20 SDOH — ECONOMIC STABILITY: HOUSING INSECURITY
IN THE LAST 12 MONTHS, WAS THERE A TIME WHEN YOU DID NOT HAVE A STEADY PLACE TO SLEEP OR SLEPT IN A SHELTER (INCLUDING NOW)?: NO

## 2023-09-20 SDOH — HEALTH STABILITY: PHYSICAL HEALTH: ON AVERAGE, HOW MANY DAYS PER WEEK DO YOU ENGAGE IN MODERATE TO STRENUOUS EXERCISE (LIKE A BRISK WALK)?: 2 DAYS

## 2023-09-20 ASSESSMENT — PATIENT HEALTH QUESTIONNAIRE - PHQ9
SUM OF ALL RESPONSES TO PHQ QUESTIONS 1-9: 2
SUM OF ALL RESPONSES TO PHQ QUESTIONS 1-9: 2
1. LITTLE INTEREST OR PLEASURE IN DOING THINGS: 1
2. FEELING DOWN, DEPRESSED OR HOPELESS: 1
SUM OF ALL RESPONSES TO PHQ QUESTIONS 1-9: 2
SUM OF ALL RESPONSES TO PHQ QUESTIONS 1-9: 2
SUM OF ALL RESPONSES TO PHQ9 QUESTIONS 1 & 2: 2

## 2023-09-20 ASSESSMENT — LIFESTYLE VARIABLES
HOW OFTEN DO YOU HAVE A DRINK CONTAINING ALCOHOL: 2-4 TIMES A MONTH
HOW MANY STANDARD DRINKS CONTAINING ALCOHOL DO YOU HAVE ON A TYPICAL DAY: 1
HOW MANY STANDARD DRINKS CONTAINING ALCOHOL DO YOU HAVE ON A TYPICAL DAY: 1 OR 2
HOW OFTEN DO YOU HAVE A DRINK CONTAINING ALCOHOL: 3
HOW OFTEN DO YOU HAVE SIX OR MORE DRINKS ON ONE OCCASION: 1

## 2023-09-21 ENCOUNTER — OFFICE VISIT (OUTPATIENT)
Dept: FAMILY MEDICINE CLINIC | Age: 68
End: 2023-09-21

## 2023-09-21 VITALS
WEIGHT: 147 LBS | OXYGEN SATURATION: 100 % | HEART RATE: 80 BPM | HEIGHT: 63 IN | DIASTOLIC BLOOD PRESSURE: 70 MMHG | SYSTOLIC BLOOD PRESSURE: 98 MMHG | BODY MASS INDEX: 26.05 KG/M2

## 2023-09-21 DIAGNOSIS — Z13.1 DIABETES MELLITUS SCREENING: ICD-10-CM

## 2023-09-21 DIAGNOSIS — F41.9 ANXIETY: ICD-10-CM

## 2023-09-21 DIAGNOSIS — F51.04 PSYCHOPHYSIOLOGICAL INSOMNIA: ICD-10-CM

## 2023-09-21 DIAGNOSIS — B00.1 RECURRENT COLD SORES: ICD-10-CM

## 2023-09-21 DIAGNOSIS — Z00.00 ENCOUNTER FOR ANNUAL WELLNESS VISIT (AWV) IN MEDICARE PATIENT: Primary | ICD-10-CM

## 2023-09-21 DIAGNOSIS — Z79.899 LONG TERM USE OF DRUG: ICD-10-CM

## 2023-09-21 DIAGNOSIS — E78.00 HYPERCHOLESTEREMIA: ICD-10-CM

## 2023-09-21 RX ORDER — ALPRAZOLAM 0.5 MG/1
0.5 TABLET ORAL 4 TIMES DAILY PRN
Qty: 42 TABLET | Refills: 1 | Status: SHIPPED | OUTPATIENT
Start: 2023-09-21 | End: 2023-10-21

## 2023-09-21 RX ORDER — VALACYCLOVIR HYDROCHLORIDE 1 G/1
2000 TABLET, FILM COATED ORAL 2 TIMES DAILY
Qty: 4 TABLET | Refills: 2 | Status: SHIPPED | OUTPATIENT
Start: 2023-09-21 | End: 2023-09-22

## 2023-09-21 NOTE — PATIENT INSTRUCTIONS
You may receive a survey regarding the care you received during your visit. Your input is valuable to us. We encourage you to complete and return your survey. We hope you will choose us in the future for your healthcare needs. GENERAL OFFICE POLICIES      Telephone Calls: Messages will be answered within 1-2 business days, unless the provider is out of the office. If it is urgent a covering provider will answer. (this does not include Medication refills). MyChart: We recommend all patients sign up for Share[employer]. Through this portal you can see your lab results, request refills, schedule appointments, pay your bill and send messages to the office. [employer] messages will be answered within 1-2 business days unless the provider is out of the office. For urgent matters, please call the office. Appointments:  All appointments must be scheduled. We ask all patients to schedule their next follow up appointment before they leave the office to make sure you will be able to be seen before you run out of medications. 24 hours notice is required to cancel or reschedule an appointment to avoid being marked as a no show. You may be dismissed from the practice after 3 no shows. LATE for Appointment: If you are 15 or more minutes late for your appointment, you may be asked to reschedule. MA/LAB APPTS: Must be scheduled, cannot accept walk in lab visits. We only draw labs for patients established in our office. We only do injections for medications ordered by our office. Acute Sick Visits:  Nothing other than acute complaint will be addressed at this visit. TRADITIONAL MEDICARE  DOES NOT COVER PHYSICALS  MEDICARE WELLNESS VISITS: These are NOT physicals but the free annual visit offered by Medicare to discuss wellness issues. Medication refills, checkups, etc. will not be addressed during this visit.   Medication Refills: Refills are handled electronically so please contact your pharmacy for medication refills

## 2023-09-21 NOTE — PROGRESS NOTES
Renal no Medicare Annual Wellness Visit    Vibha Reddy is here for Medicare AWV    Assessment & Plan       Encounter for annual wellness visit (AWV) in Medicare patient  Recommendations for Preventive Services Due: see orders and patient instructions/AVS.  Recommended screening schedule for the next 5-10 years is provided to the patient in written form: see Patient Instructions/AVS.  Diagnoses and all orders for this visit:        Long term use of drug  -     Urine Drug Screen; Future  Anxiety  Psychological insomnia. Controlled substances monitoring: possible medication side effects, risk of tolerance and/or dependence, and alternative treatments discussed, no signs of potential drug abuse or diversion identified, OARRS report reviewed today- activity consistent with treatment plan, medication contract signed today, and random urine drug screen sent today. Stable on current current medications continue Xanax as prescribed  ALPRAZolam (XANAX) 0.5 MG tablet; Take 1 tablet by mouth 4 times daily as needed for Sleep or Anxiety for up to 30 days. FILL 9/21/23  Long term use of drug  Urine Drug Screen; Future  Left office without completing UDS this was an accident I am sure will call to notify her that this needs to be done before any refills will cancel refills if not completed within the month  Follow up in three months       Recurrent cold sores  -     valACYclovir (VALTREX) 1 g tablet; Take 2 tablets by mouth 2 times daily for 1 day    Hypercholesteremia  -     Lipid, Fasting; Future    Diabetes mellitus screening  -     Comprehensive Metabolic Panel; Future          Subjective       Patient's complete Health Risk Assessment and screening values have been reviewed and are found in Flowsheets. The following problems were reviewed today and where indicated follow up appointments were made and/or referrals ordered.   Her son is going through marital issues - her family never supported the marriage her dad is a

## 2023-09-24 ENCOUNTER — PATIENT MESSAGE (OUTPATIENT)
Dept: FAMILY MEDICINE CLINIC | Age: 68
End: 2023-09-24

## 2023-09-25 NOTE — TELEPHONE ENCOUNTER
From: Anai Gunter  To: Angie Rose  Sent: 9/24/2023 8:17 PM EDT  Subject: medication    Hello    We forgot to get the urine drug screen for the Xanax medication  Please make an appointment at your convenience just for lab visit so we can get that done and that is something that we would do yearly      Thank you   [facility]

## 2023-09-29 ENCOUNTER — NURSE ONLY (OUTPATIENT)
Dept: FAMILY MEDICINE CLINIC | Age: 68
End: 2023-09-29
Payer: MEDICARE

## 2023-09-29 DIAGNOSIS — E78.00 HYPERCHOLESTEREMIA: ICD-10-CM

## 2023-09-29 DIAGNOSIS — Z13.1 DIABETES MELLITUS SCREENING: ICD-10-CM

## 2023-09-29 DIAGNOSIS — F41.9 ANXIETY: ICD-10-CM

## 2023-09-29 DIAGNOSIS — Z79.899 LONG TERM USE OF DRUG: Primary | ICD-10-CM

## 2023-09-29 LAB
ALBUMIN SERPL-MCNC: 4.5 G/DL (ref 3.4–5)
ALBUMIN/GLOB SERPL: 2.1 {RATIO} (ref 1.1–2.2)
ALP SERPL-CCNC: 85 U/L (ref 40–129)
ALT SERPL-CCNC: 17 U/L (ref 10–40)
AMPHETAMINES UR QL SCN>1000 NG/ML: NORMAL
ANION GAP SERPL CALCULATED.3IONS-SCNC: 9 MMOL/L (ref 3–16)
AST SERPL-CCNC: 17 U/L (ref 15–37)
BARBITURATES UR QL SCN>200 NG/ML: NORMAL
BENZODIAZ UR QL SCN>200 NG/ML: NORMAL
BILIRUB SERPL-MCNC: 0.8 MG/DL (ref 0–1)
BUN SERPL-MCNC: 12 MG/DL (ref 7–20)
CALCIUM SERPL-MCNC: 9 MG/DL (ref 8.3–10.6)
CANNABINOIDS UR QL SCN>50 NG/ML: NORMAL
CHLORIDE SERPL-SCNC: 103 MMOL/L (ref 99–110)
CHOLEST SERPL-MCNC: 162 MG/DL (ref 0–199)
CO2 SERPL-SCNC: 28 MMOL/L (ref 21–32)
COCAINE UR QL SCN: NORMAL
CREAT SERPL-MCNC: 0.6 MG/DL (ref 0.6–1.2)
DRUG SCREEN COMMENT UR-IMP: NORMAL
FENTANYL SCREEN, URINE: NORMAL
GFR SERPLBLD CREATININE-BSD FMLA CKD-EPI: >60 ML/MIN/{1.73_M2}
GLUCOSE SERPL-MCNC: 83 MG/DL (ref 70–99)
HDLC SERPL-MCNC: 55 MG/DL (ref 40–60)
LDLC SERPL CALC-MCNC: 78 MG/DL
METHADONE UR QL SCN>300 NG/ML: NORMAL
OPIATES UR QL SCN>300 NG/ML: NORMAL
OXYCODONE UR QL SCN: NORMAL
PCP UR QL SCN>25 NG/ML: NORMAL
PH UR STRIP: 5.5 [PH]
POTASSIUM SERPL-SCNC: 4 MMOL/L (ref 3.5–5.1)
PROT SERPL-MCNC: 6.6 G/DL (ref 6.4–8.2)
SODIUM SERPL-SCNC: 140 MMOL/L (ref 136–145)
TRIGL SERPL-MCNC: 146 MG/DL (ref 0–150)
VLDLC SERPL CALC-MCNC: 29 MG/DL

## 2023-09-29 PROCEDURE — 36415 COLL VENOUS BLD VENIPUNCTURE: CPT | Performed by: NURSE PRACTITIONER

## 2023-09-30 DIAGNOSIS — E78.5 HYPERLIPIDEMIA, UNSPECIFIED HYPERLIPIDEMIA TYPE: ICD-10-CM

## 2023-09-30 DIAGNOSIS — F41.9 ANXIETY: ICD-10-CM

## 2023-09-30 DIAGNOSIS — F51.04 PSYCHOPHYSIOLOGICAL INSOMNIA: ICD-10-CM

## 2023-09-30 RX ORDER — ALPRAZOLAM 0.5 MG/1
0.5 TABLET ORAL 4 TIMES DAILY PRN
Qty: 42 TABLET | Refills: 1 | Status: SHIPPED | OUTPATIENT
Start: 2023-10-21 | End: 2023-11-20

## 2023-09-30 RX ORDER — ATORVASTATIN CALCIUM 10 MG/1
TABLET, FILM COATED ORAL
Qty: 90 TABLET | Refills: 3 | Status: SHIPPED | OUTPATIENT
Start: 2023-09-30 | End: 2024-07-28

## 2024-01-03 ENCOUNTER — TELEPHONE (OUTPATIENT)
Dept: FAMILY MEDICINE CLINIC | Age: 69
End: 2024-01-03

## 2024-01-03 NOTE — TELEPHONE ENCOUNTER
I called pt .  She called our billing and they told her they will rebill that we coded it wrong.    She noticed a bill from Dec 20, 2023 for 35.00.  She does not have a copay for  us.

## 2024-03-18 ENCOUNTER — E-VISIT (OUTPATIENT)
Dept: FAMILY MEDICINE CLINIC | Age: 69
End: 2024-03-18
Payer: MEDICARE

## 2024-03-18 DIAGNOSIS — F41.9 ANXIETY: ICD-10-CM

## 2024-03-18 DIAGNOSIS — F51.04 PSYCHOPHYSIOLOGICAL INSOMNIA: ICD-10-CM

## 2024-03-18 PROCEDURE — 99422 OL DIG E/M SVC 11-20 MIN: CPT | Performed by: NURSE PRACTITIONER

## 2024-03-18 RX ORDER — ALPRAZOLAM 0.5 MG/1
0.5 TABLET ORAL 4 TIMES DAILY PRN
Qty: 42 TABLET | Refills: 2 | Status: SHIPPED | OUTPATIENT
Start: 2024-03-18 | End: 2024-04-17

## 2024-03-18 ASSESSMENT — PATIENT HEALTH QUESTIONNAIRE - GENERAL
HAVE YOU BEEN EXPERIENCING INVOLUNTARY WEIGHT GAIN OR LOSS: N
HAVE YOU BEEN EXPERIENCING ABDOMINAL DISCOMFORT, NAUSEA OR CHANGES IN YOUR BOWEL PATTERN: N
HAVE YOU BEEN EXPERIENCING AN UNUSUALLY DRY MOUTH: N
HAVE YOU BEEN EXPERIENCING VIVID DREAMS OR NIGHTMARES THAT INTERFERE WITH YOUR SLEEP: N
HAVE YOU BEEN EXPERIENCING VERY LOW OR VERY HIGH MOODS: N
DO YOU HAVE A FASTER HEART RATE THAN USUAL, DOES YOUR HEART RATE FEEL IRREGULAR OR DO YOU FEEL LIKE YOUR HEART IS POUNDING AT REST: N
HAVE YOU BEEN EXPERIENCING NEW OR WORSENING VISUAL CHANGES: N
HAVE YOU BEEN EXPERIENCING NEW OR WORSENING PROBLEMS WITH YOUR SEXUAL FUNCTION: N
HAVE YOU BEEN EXPERIENCING LIGHT HEADEDNESS, WOOZINESS, OR DIZZINESS, ESPECIALLY UPON STANDING FROM SITTING OR LYING POSITIONS: N
HAVE YOU BEEN EXPERIENCING RACING THOUGHTS OR IMPULSIVE BEHAVIOR: N
DO YOU HAVE ANY NEW RASHES OR UNEXPLAINED ITCHING OR SWELLING: N
HAVE YOU BEEN EXPERIENCING UNEXPLAINED HIGH FEVERS OR EXCESSIVE SWEATING: N
HAVE YOU BEEN EXPERIENCING NEW OR WORSENING DIFFICULTY WITH URINATION OR CHANGE IN URINARY PATTERN: N
HAVE YOU BEEN EXPERIENCING NEW OR WORSENING MUSCLE TWITCHING, SHAKINESS, OR OTHER UNUSUAL CHANGES IN YOUR MUSCLE MOVEMENT: N
HAVE YOU BEEN EXPERIENCING NEW OR WORSENING HEADACHES: N
HAVE YOU BEEN EXPERIENCING HALLUCINATIONS OR CONFUSION: N
SINCE YOUR LAST VISIT, HAVE YOU EXPERIENCED EPISODES OF FAINTING OR NEAR FAINTING: N

## 2024-03-18 ASSESSMENT — PATIENT HEALTH QUESTIONNAIRE - PHQ9
8. MOVING OR SPEAKING SO SLOWLY THAT OTHER PEOPLE COULD HAVE NOTICED. OR THE OPPOSITE, BEING SO FIGETY OR RESTLESS THAT YOU HAVE BEEN MOVING AROUND A LOT MORE THAN USUAL: NOT AT ALL
6. FEELING BAD ABOUT YOURSELF - OR THAT YOU ARE A FAILURE OR HAVE LET YOURSELF OR YOUR FAMILY DOWN: NOT AT ALL
7. TROUBLE CONCENTRATING ON THINGS, SUCH AS READING THE NEWSPAPER OR WATCHING TELEVISION: NOT AT ALL
1. LITTLE INTEREST OR PLEASURE IN DOING THINGS: NOT AT ALL
1. LITTLE INTEREST OR PLEASURE IN DOING THINGS: NOT AT ALL
4. FEELING TIRED OR HAVING LITTLE ENERGY: NOT AT ALL
SUM OF ALL RESPONSES TO PHQ QUESTIONS 1-9: 0
SUM OF ALL RESPONSES TO PHQ QUESTIONS 1-9: 0
2. FEELING DOWN, DEPRESSED OR HOPELESS: NOT AT ALL
7. TROUBLE CONCENTRATING ON THINGS, SUCH AS READING THE NEWSPAPER OR WATCHING TELEVISION: NOT AT ALL
9. THOUGHTS THAT YOU WOULD BE BETTER OFF DEAD, OR OF HURTING YOURSELF: NOT AT ALL
3. TROUBLE FALLING OR STAYING ASLEEP: NOT AT ALL
2. FEELING DOWN, DEPRESSED OR HOPELESS: NOT AT ALL
5. POOR APPETITE OR OVEREATING: NOT AT ALL
5. POOR APPETITE OR OVEREATING: NOT AT ALL
SUM OF ALL RESPONSES TO PHQ9 QUESTIONS 1 & 2: 0
SUM OF ALL RESPONSES TO PHQ QUESTIONS 1-9: 0
4. FEELING TIRED OR HAVING LITTLE ENERGY: NOT AT ALL
6. FEELING BAD ABOUT YOURSELF - OR THAT YOU ARE A FAILURE OR HAVE LET YOURSELF OR YOUR FAMILY DOWN: NOT AT ALL
9. THOUGHTS THAT YOU WOULD BE BETTER OFF DEAD, OR OF HURTING YOURSELF: NOT AT ALL
10. IF YOU CHECKED OFF ANY PROBLEMS, HOW DIFFICULT HAVE THESE PROBLEMS MADE IT FOR YOU TO DO YOUR WORK, TAKE CARE OF THINGS AT HOME, OR GET ALONG WITH OTHER PEOPLE: SOMEWHAT DIFFICULT
SUM OF ALL RESPONSES TO PHQ QUESTIONS 1-9: 0
10. IF YOU CHECKED OFF ANY PROBLEMS, HOW DIFFICULT HAVE THESE PROBLEMS MADE IT FOR YOU TO DO YOUR WORK, TAKE CARE OF THINGS AT HOME, OR GET ALONG WITH OTHER PEOPLE: SOMEWHAT DIFFICULT
3. TROUBLE FALLING OR STAYING ASLEEP: NOT AT ALL
8. MOVING OR SPEAKING SO SLOWLY THAT OTHER PEOPLE COULD HAVE NOTICED. OR THE OPPOSITE - BEING SO FIDGETY OR RESTLESS THAT YOU HAVE BEEN MOVING AROUND A LOT MORE THAN USUAL: NOT AT ALL
SUM OF ALL RESPONSES TO PHQ QUESTIONS 1-9: 0

## 2024-03-18 ASSESSMENT — ANXIETY QUESTIONNAIRES
5. BEING SO RESTLESS THAT IT IS HARD TO SIT STILL: NOT AT ALL
2. NOT BEING ABLE TO STOP OR CONTROL WORRYING: NOT AT ALL
6. BECOMING EASILY ANNOYED OR IRRITABLE: NOT AT ALL
7. FEELING AFRAID AS IF SOMETHING AWFUL MIGHT HAPPEN: NOT AT ALL
2. NOT BEING ABLE TO STOP OR CONTROL WORRYING: NOT AT ALL
3. WORRYING TOO MUCH ABOUT DIFFERENT THINGS: NOT AT ALL
IF YOU CHECKED OFF ANY PROBLEMS ON THIS QUESTIONNAIRE, HOW DIFFICULT HAVE THESE PROBLEMS MADE IT FOR YOU TO DO YOUR WORK, TAKE CARE OF THINGS AT HOME, OR GET ALONG WITH OTHER PEOPLE: SOMEWHAT DIFFICULT
7. FEELING AFRAID AS IF SOMETHING AWFUL MIGHT HAPPEN: NOT AT ALL
IF YOU CHECKED OFF ANY PROBLEMS ON THIS QUESTIONNAIRE, HOW DIFFICULT HAVE THESE PROBLEMS MADE IT FOR YOU TO DO YOUR WORK, TAKE CARE OF THINGS AT HOME, OR GET ALONG WITH OTHER PEOPLE: SOMEWHAT DIFFICULT
4. TROUBLE RELAXING: NOT AT ALL
GAD7 TOTAL SCORE: 0
4. TROUBLE RELAXING: NOT AT ALL
3. WORRYING TOO MUCH ABOUT DIFFERENT THINGS: NOT AT ALL
1. FEELING NERVOUS, ANXIOUS, OR ON EDGE: NOT AT ALL
GAD7 TOTAL SCORE: 0
5. BEING SO RESTLESS THAT IT IS HARD TO SIT STILL: NOT AT ALL
1. FEELING NERVOUS, ANXIOUS, OR ON EDGE: NOT AT ALL
6. BECOMING EASILY ANNOYED OR IRRITABLE: NOT AT ALL

## 2024-03-18 NOTE — PROGRESS NOTES
11-20 minutes were spent on the digital evaluation and management of this patient. - oarrs consistent

## 2024-06-20 ENCOUNTER — PATIENT MESSAGE (OUTPATIENT)
Dept: FAMILY MEDICINE CLINIC | Age: 69
End: 2024-06-20

## 2024-06-20 NOTE — TELEPHONE ENCOUNTER
Amada called back and said someone put in the wrong provider number. So we need to resubmit the bill with the correct provider number and her bill should take care of it.

## 2024-06-20 NOTE — TELEPHONE ENCOUNTER
----- Message from Tamela Rojas sent at 6/20/2024 10:47 AM EDT -----          ----- Message sent from Hector Greco to Amada Rodas at 5/15/2024  1:29 AM -----   Hello,       We checked your account & found on the account there is no outstanding balance. So no need to pay any charge. For any further questions please contact us at [phone], Monday through Friday from 8:00 am to 5:00 pm.       Thank you,       Customer Service         ----- Message -----        From:Amada Rodas        Sent:5/5/2024  5:18 AM EDT          To:Customer Service     Subject:Visit from Dec. 20, 2023      Topic: [facility] Billing Questions.      I need me to call me concerning this incorrect bill for the above mentioned date.  I was told by your office in December of this year, that the bill was coded wrong and there for [facility] will not pay the $35.00 they say is a copay.  I never paid a copay for any other visits in 2023. I need someone to call me direct to get this corrected.  It should not take 6 months for a reply.      Amada Rodas   [phone]     Visit from Dec. 20, 2023  (Newest Message First)  View All Conversations on this Encounter  Tamela Rojas routed conversation to [facility] Staff2 hours ago (10:47 AM)     Tamela Rojas routed conversation to [facility] Staff2 hours ago (10:47 AM)     Tamela Rojas M2 hours ago (10:43 AM)     MB  Pt just received another bill for the 35.00.     Is there a way we can just eat the 35.00?     Pt has been calling on this monthly.    Pt is upset that we just keep telling her we'd take care of it but she keeps getting bills.        Is Sharonda in [employer] [employer]??  She has some appointments coming up and wants to know  so she can find another provider if she needs to.      Please call pt or send her a [employer] message if we have any answers.     Pt is not going to pay this --We need to straighten this out.  Please do  not send

## 2024-06-20 NOTE — TELEPHONE ENCOUNTER
From: Amada Rodas  To: Sharonda Anum Knapp  Sent: 6/20/2024 10:52 AM EDT  Subject: Office visit from Dec. 2023    Good morning,  I am writing in regards to a bill I have been receiving from the office visit in Dec. 2023. I had several visits with Sharonda in 2023. I had a visit in Sept. 23 where with my insurance, I had no co-pay. In Oct. there was negotiations between [facility] and [facility]. A the two parties came to mutual agreement. On December 20th, I had a visit with Sharonda and paid no co-pay. I started receiving a bill every month from [facility] saying I owe $35.00 due to Sharonda being out of network. I was also told the credentials needed to be updated. Then I was told the billing was wrong and they would resubmit. Then I was told they considered Sharonda a specialist. I have called both [facility] and [facility] every month since January trying to get this taken care of. [facility] to take care of the $35.00.   I have talked to Customer Service, Billing, [facility] and your office every month since January. I talked to Tamela this morning who was going to forward this information to someone in your office who handles the billing/insurance.   [facility] will not pay this bill. Something has to be messed up since the negotiations in Sept/Oct.       Thank you in advance for your help in trying to get this mess straightened out.      Amada Rodas  [phone]

## 2024-06-20 NOTE — TELEPHONE ENCOUNTER
Waiting for a response from credentialing.  Placed information on Enrollment and Credentialing Intake Form.

## 2024-06-21 ENCOUNTER — PATIENT MESSAGE (OUTPATIENT)
Dept: FAMILY MEDICINE CLINIC | Age: 69
End: 2024-06-21

## 2024-06-24 ENCOUNTER — COMMUNITY OUTREACH (OUTPATIENT)
Dept: FAMILY MEDICINE CLINIC | Age: 69
End: 2024-06-24

## 2024-06-24 NOTE — PROGRESS NOTES
Patient's HM shows they are overdue for Mammogram.   [id number] and  files searched  without success.

## 2024-06-28 ENCOUNTER — OFFICE VISIT (OUTPATIENT)
Dept: FAMILY MEDICINE CLINIC | Age: 69
End: 2024-06-28
Payer: MEDICARE

## 2024-06-28 VITALS
SYSTOLIC BLOOD PRESSURE: 130 MMHG | DIASTOLIC BLOOD PRESSURE: 76 MMHG | BODY MASS INDEX: 26.26 KG/M2 | OXYGEN SATURATION: 98 % | HEART RATE: 96 BPM | HEIGHT: 63 IN | WEIGHT: 148.2 LBS

## 2024-06-28 DIAGNOSIS — S16.1XXA STRAIN OF STERNOCLEIDOMASTOID MUSCLE, INITIAL ENCOUNTER: Primary | ICD-10-CM

## 2024-06-28 PROCEDURE — 99213 OFFICE O/P EST LOW 20 MIN: CPT

## 2024-06-28 PROCEDURE — 1123F ACP DISCUSS/DSCN MKR DOCD: CPT

## 2024-06-28 RX ORDER — METHYLPREDNISOLONE 4 MG/1
TABLET ORAL
Qty: 1 KIT | Refills: 0 | Status: SHIPPED | OUTPATIENT
Start: 2024-06-28 | End: 2024-07-04

## 2024-06-28 ASSESSMENT — ENCOUNTER SYMPTOMS
SHORTNESS OF BREATH: 0
EYE REDNESS: 0
COUGH: 0
BACK PAIN: 0
SINUS PAIN: 0
TROUBLE SWALLOWING: 0
COLOR CHANGE: 0
EYE ITCHING: 0
EYE DISCHARGE: 0
WHEEZING: 0
SORE THROAT: 0
SINUS PRESSURE: 0
RHINORRHEA: 0
CHEST TIGHTNESS: 0

## 2024-06-28 NOTE — PROGRESS NOTES
Amada Rodas (:  1955) is a 69 y.o. female,Established patient, here for evaluation of the following chief complaint(s):  Pain (Pt C/O Pain in right side neck x 1 week  )      Assessment & Plan   1. Strain of sternocleidomastoid muscle, initial encounter  -Ear infection versus FREDY versus versus neck strain.  -Given patient specifies pain along the sternocleidomastoid from top to bottom on the right side of the neck, most suspicious for strain of neck.  -Treatment options discussed.  Medrol Dosepak is being sent to the pharmacy.   The medication uses and side effects were discussed with the patient.  Patient verbalized understanding and agrees to the plan.  -Emphasis on RICE, heat, stretching.  -Follow-up if no improvement.  Can consider PT referral.  -     methylPREDNISolone (MEDROL DOSEPACK) 4 MG tablet; Take by mouth., Disp-1 kit, R-0Normal      Return if symptoms worsen or fail to improve.       Subjective   HPI  She has had ain in her right neck behind her right ear. She had a cold last week. This pain feels different than her chrionic C spine disc issues. Pain goes down her whole neck. The neck pain has been present for a week. No fever or chills, but has been getting a headache. She has arthritis in her neck as well, feels different. Ibuprofen has helped some. It is hard to sleep, painful to move her neck to the left, not as bad to the right. No longer having a runny nose, most cold symptoms are gone except PND. No left ear or left neck issues. She has right ear nerve damage.  Given history of an nerve damage on the right, she is unsure if she is having ear pain      Review of Systems   Constitutional:  Negative for activity change, chills, diaphoresis, fatigue and fever.   HENT:  Positive for hearing loss (Right, chronic) and postnasal drip. Negative for congestion, ear discharge, ear pain, rhinorrhea, sinus pressure, sinus pain, sneezing, sore throat and trouble swallowing.    Eyes:  Negative for

## 2024-06-28 NOTE — PATIENT INSTRUCTIONS

## 2024-07-09 ENCOUNTER — TELEPHONE (OUTPATIENT)
Dept: FAMILY MEDICINE CLINIC | Age: 69
End: 2024-07-09

## 2024-07-09 NOTE — TELEPHONE ENCOUNTER
Pt keeps getting messages on [employer] saying she owes the 35.00 from Dec.  She wants to know if there is something we can do to stop this until it is resolved.    If we find out anything we can call pt.

## 2024-09-20 SDOH — HEALTH STABILITY: PHYSICAL HEALTH: ON AVERAGE, HOW MANY DAYS PER WEEK DO YOU ENGAGE IN MODERATE TO STRENUOUS EXERCISE (LIKE A BRISK WALK)?: 4 DAYS

## 2024-09-20 SDOH — ECONOMIC STABILITY: FOOD INSECURITY: WITHIN THE PAST 12 MONTHS, YOU WORRIED THAT YOUR FOOD WOULD RUN OUT BEFORE YOU GOT MONEY TO BUY MORE.: NEVER TRUE

## 2024-09-20 SDOH — ECONOMIC STABILITY: FOOD INSECURITY: WITHIN THE PAST 12 MONTHS, THE FOOD YOU BOUGHT JUST DIDN'T LAST AND YOU DIDN'T HAVE MONEY TO GET MORE.: NEVER TRUE

## 2024-09-20 SDOH — ECONOMIC STABILITY: INCOME INSECURITY: HOW HARD IS IT FOR YOU TO PAY FOR THE VERY BASICS LIKE FOOD, HOUSING, MEDICAL CARE, AND HEATING?: NOT HARD AT ALL

## 2024-09-20 SDOH — HEALTH STABILITY: PHYSICAL HEALTH: ON AVERAGE, HOW MANY MINUTES DO YOU ENGAGE IN EXERCISE AT THIS LEVEL?: 40 MIN

## 2024-09-20 ASSESSMENT — PATIENT HEALTH QUESTIONNAIRE - PHQ9
SUM OF ALL RESPONSES TO PHQ QUESTIONS 1-9: 0
SUM OF ALL RESPONSES TO PHQ9 QUESTIONS 1 & 2: 0
SUM OF ALL RESPONSES TO PHQ QUESTIONS 1-9: 0
1. LITTLE INTEREST OR PLEASURE IN DOING THINGS: NOT AT ALL
SUM OF ALL RESPONSES TO PHQ QUESTIONS 1-9: 0
2. FEELING DOWN, DEPRESSED OR HOPELESS: NOT AT ALL
SUM OF ALL RESPONSES TO PHQ QUESTIONS 1-9: 0

## 2024-09-20 ASSESSMENT — LIFESTYLE VARIABLES
HOW OFTEN DO YOU HAVE A DRINK CONTAINING ALCOHOL: 3
HOW OFTEN DO YOU HAVE SIX OR MORE DRINKS ON ONE OCCASION: 1
HOW OFTEN DO YOU HAVE A DRINK CONTAINING ALCOHOL: 2-4 TIMES A MONTH
HOW MANY STANDARD DRINKS CONTAINING ALCOHOL DO YOU HAVE ON A TYPICAL DAY: 1
HOW MANY STANDARD DRINKS CONTAINING ALCOHOL DO YOU HAVE ON A TYPICAL DAY: 1 OR 2

## 2024-09-23 ENCOUNTER — OFFICE VISIT (OUTPATIENT)
Dept: FAMILY MEDICINE CLINIC | Age: 69
End: 2024-09-23
Payer: MEDICARE

## 2024-09-23 VITALS
DIASTOLIC BLOOD PRESSURE: 74 MMHG | OXYGEN SATURATION: 96 % | RESPIRATION RATE: 16 BRPM | HEIGHT: 63 IN | SYSTOLIC BLOOD PRESSURE: 112 MMHG | BODY MASS INDEX: 25.87 KG/M2 | WEIGHT: 146 LBS | HEART RATE: 78 BPM

## 2024-09-23 DIAGNOSIS — Z12.11 COLON CANCER SCREENING: Primary | ICD-10-CM

## 2024-09-23 DIAGNOSIS — Z23 NEED FOR 23-POLYVALENT PNEUMOCOCCAL POLYSACCHARIDE VACCINE: ICD-10-CM

## 2024-09-23 DIAGNOSIS — Z23 NEED FOR INFLUENZA VACCINATION: ICD-10-CM

## 2024-09-23 DIAGNOSIS — E78.00 HYPERCHOLESTEREMIA: ICD-10-CM

## 2024-09-23 DIAGNOSIS — Z00.00 MEDICARE ANNUAL WELLNESS VISIT, SUBSEQUENT: ICD-10-CM

## 2024-09-23 DIAGNOSIS — Z12.31 ENCOUNTER FOR SCREENING MAMMOGRAM FOR MALIGNANT NEOPLASM OF BREAST: ICD-10-CM

## 2024-09-23 DIAGNOSIS — Z13.1 DIABETES MELLITUS SCREENING: ICD-10-CM

## 2024-09-23 LAB
CHOLEST SERPL-MCNC: 176 MG/DL (ref 0–199)
GLUCOSE P FAST SERPL-MCNC: 75 MG/DL (ref 70–99)
HDLC SERPL-MCNC: 51 MG/DL (ref 40–60)
LDLC SERPL CALC-MCNC: 93 MG/DL
TRIGL SERPL-MCNC: 161 MG/DL (ref 0–150)
VLDLC SERPL CALC-MCNC: 32 MG/DL

## 2024-09-23 PROCEDURE — 1123F ACP DISCUSS/DSCN MKR DOCD: CPT | Performed by: NURSE PRACTITIONER

## 2024-09-23 PROCEDURE — 36415 COLL VENOUS BLD VENIPUNCTURE: CPT | Performed by: NURSE PRACTITIONER

## 2024-09-23 PROCEDURE — G0009 ADMIN PNEUMOCOCCAL VACCINE: HCPCS | Performed by: NURSE PRACTITIONER

## 2024-09-23 PROCEDURE — G0439 PPPS, SUBSEQ VISIT: HCPCS | Performed by: NURSE PRACTITIONER

## 2024-09-23 PROCEDURE — 90732 PPSV23 VACC 2 YRS+ SUBQ/IM: CPT | Performed by: NURSE PRACTITIONER

## 2024-09-23 RX ORDER — ALPRAZOLAM 0.5 MG
0.5 TABLET ORAL 4 TIMES DAILY PRN
Qty: 42 TABLET | Refills: 2 | Status: CANCELLED | OUTPATIENT
Start: 2024-09-23 | End: 2024-10-23

## 2024-10-27 ENCOUNTER — PATIENT MESSAGE (OUTPATIENT)
Dept: FAMILY MEDICINE CLINIC | Age: 69
End: 2024-10-27

## 2024-12-14 DIAGNOSIS — E78.5 HYPERLIPIDEMIA, UNSPECIFIED HYPERLIPIDEMIA TYPE: ICD-10-CM

## 2024-12-16 RX ORDER — ATORVASTATIN CALCIUM 10 MG/1
TABLET, FILM COATED ORAL
Qty: 90 TABLET | Refills: 3 | Status: SHIPPED | OUTPATIENT
Start: 2024-12-16

## 2025-02-27 ENCOUNTER — TELEMEDICINE (OUTPATIENT)
Dept: FAMILY MEDICINE CLINIC | Age: 70
End: 2025-02-27

## 2025-02-27 DIAGNOSIS — J06.9 PROTRACTED URI: Primary | ICD-10-CM

## 2025-02-27 RX ORDER — ATORVASTATIN CALCIUM 10 MG/1
10 TABLET, FILM COATED ORAL DAILY
COMMUNITY
End: 2025-02-27 | Stop reason: ALTCHOICE

## 2025-02-27 RX ORDER — PREDNISONE 10 MG/1
TABLET ORAL
Qty: 12 TABLET | Refills: 0 | Status: SHIPPED | OUTPATIENT
Start: 2025-02-27

## 2025-02-27 SDOH — ECONOMIC STABILITY: FOOD INSECURITY: WITHIN THE PAST 12 MONTHS, YOU WORRIED THAT YOUR FOOD WOULD RUN OUT BEFORE YOU GOT MONEY TO BUY MORE.: NEVER TRUE

## 2025-02-27 SDOH — ECONOMIC STABILITY: FOOD INSECURITY: WITHIN THE PAST 12 MONTHS, THE FOOD YOU BOUGHT JUST DIDN'T LAST AND YOU DIDN'T HAVE MONEY TO GET MORE.: NEVER TRUE

## 2025-02-27 ASSESSMENT — PATIENT HEALTH QUESTIONNAIRE - PHQ9
SUM OF ALL RESPONSES TO PHQ9 QUESTIONS 1 & 2: 0
SUM OF ALL RESPONSES TO PHQ QUESTIONS 1-9: 0
SUM OF ALL RESPONSES TO PHQ QUESTIONS 1-9: 0
2. FEELING DOWN, DEPRESSED OR HOPELESS: NOT AT ALL
SUM OF ALL RESPONSES TO PHQ QUESTIONS 1-9: 0
SUM OF ALL RESPONSES TO PHQ QUESTIONS 1-9: 0
1. LITTLE INTEREST OR PLEASURE IN DOING THINGS: NOT AT ALL

## 2025-02-27 NOTE — PROGRESS NOTES
Amada Rodas, was evaluated through a synchronous (real-time) audio-video encounter. The patient (or guardian if applicable) is aware that this is a billable service, which includes applicable co-pays. This Virtual Visit was conducted with patient's (and/or legal guardian's) consent. Patient identification was verified, and a caregiver was present when appropriate.   The patient was located at Home: [address] [place] [id number]  Provider was located at Home (Appt Dept State): [place]  Confirm you are appropriately licensed, registered, or certified to deliver care in the state where the patient is located as indicated above. If you are not or unsure, please re-schedule the visit: Yes, I confirm.     Amada Rodas (:  1955) is a Established patient, presenting virtually for evaluation of the following:      Below is the assessment and plan developed based on review of pertinent history, physical exam, labs, studies, and medications.     Assessment & Plan      No follow-ups on file.       Subjective   HPI    Amada c/o sinus congestion and pain since 27 she went to urgent care was treated  with a medrol dose violeta  Said she had the flu  the medrol dose violeta did help but the sinus pressure and pain nasal drainage - coughing up  green stuff  Denies ear pain - sore  throat  Review of Systems   PER HPI    Objective   Patient-Reported Vitals  No data recorded     Physical Exam  [INSTRUCTIONS:  \"[x]\" Indicates a positive item  \"[]\" Indicates a negative item  -- DELETE ALL ITEMS NOT EXAMINED]    Constitutional: [x] Appears well-developed and well-nourished [x] No apparent distress      [] Abnormal -     Mental status: [x] Alert and awake  [x] Oriented to person/place/time [x] Able to follow commands    [] Abnormal -     Eyes:   EOM    [x]  Normal    [] Abnormal -   Sclera  [x]  Normal    [] Abnormal -          Discharge [x]  None visible   [] Abnormal -     HENT: [x] Normocephalic, atraumatic  [] Abnormal

## 2025-06-19 ENCOUNTER — OFFICE VISIT (OUTPATIENT)
Dept: FAMILY MEDICINE CLINIC | Age: 70
End: 2025-06-19
Payer: MEDICARE

## 2025-06-19 VITALS
SYSTOLIC BLOOD PRESSURE: 116 MMHG | BODY MASS INDEX: 26.05 KG/M2 | OXYGEN SATURATION: 97 % | DIASTOLIC BLOOD PRESSURE: 70 MMHG | HEART RATE: 60 BPM | HEIGHT: 63 IN | WEIGHT: 147 LBS

## 2025-06-19 DIAGNOSIS — M25.551 RIGHT HIP PAIN: Primary | ICD-10-CM

## 2025-06-19 PROCEDURE — 1159F MED LIST DOCD IN RCRD: CPT | Performed by: NURSE PRACTITIONER

## 2025-06-19 PROCEDURE — 99213 OFFICE O/P EST LOW 20 MIN: CPT | Performed by: NURSE PRACTITIONER

## 2025-06-19 PROCEDURE — 1123F ACP DISCUSS/DSCN MKR DOCD: CPT | Performed by: NURSE PRACTITIONER

## 2025-06-19 PROCEDURE — 1160F RVW MEDS BY RX/DR IN RCRD: CPT | Performed by: NURSE PRACTITIONER

## 2025-06-19 RX ORDER — METHYLPREDNISOLONE 4 MG/1
TABLET ORAL
Qty: 1 KIT | Refills: 0 | Status: SHIPPED | OUTPATIENT
Start: 2025-06-19 | End: 2025-06-25

## 2025-06-19 NOTE — PROGRESS NOTES
Amada Rodas (:  1955) is a 70 y.o. female,Established patient, here for evaluation of the following chief complaint(s):  Hip Pain (PT C/O PAIN IN HER LOW BACK PAIN ON THE RIGHT SIDE, AND PAIN SHOOTING DOWN HER RIGHT LEG AT TIMES, AND ALSO PAIN IN HER RIGHT GROIN AREA AS WELL X 2-3 WEEKS )      Assessment & Plan  Right hip pain   Presentation suggest etiology of the hip given that the patient has pain in the pelvis that radiates to the groin.  She does not have any trochanteric tenderness.  Could be indicative of bursitis but more likely arthritis.  X-ray to help differentiate.  Will treat with Medrol Dosepak.  Exercises provided.  Follow-up as needed if no improvement.  Ortho versus PT at that time.    Orders:    XR HIP 2-3 VW W PELVIS RIGHT; Future    methylPREDNISolone (MEDROL DOSEPACK) 4 MG tablet; Take by mouth.            No follow-ups on file.    SUBJECTIVE/OBJECTIVE:  History of Present Illness  The patient is a 70-year-old female presenting today for evaluation of back and groin pain.    She has been experiencing intermittent pain in her groin area for the past few weeks, which she initially attributed to her previous back surgery. The pain is described as shooting and occurs when she is sitting or lying in bed. She also reports recent constipation, which is unusual for her. She has undergone hip x-rays at [facility] in the past which were unavailable for review, but she believes that they were normal. She frequently lifts her 2-year-old grandson, who weighs approximately 30 pounds. She is a side sleeper and experiences shooting pains on the side she sleeps on.  She states she has an extensive history of hip issues in her family and multiple family members have had to have hip replacements.  States that the pain does occasionally go down the right leg.  She denies any urinary issues.  No numbness or tingling.  No inability to walk.  Was able [place] the zoo today and manage without any medication.

## 2025-06-20 ASSESSMENT — ENCOUNTER SYMPTOMS
WHEEZING: 0
BACK PAIN: 1
COUGH: 0
SHORTNESS OF BREATH: 0

## 2025-06-24 ENCOUNTER — HOSPITAL ENCOUNTER (OUTPATIENT)
Dept: GENERAL RADIOLOGY | Age: 70
Discharge: HOME | End: 2025-06-24
Payer: MEDICARE

## 2025-06-24 ENCOUNTER — HOSPITAL ENCOUNTER (OUTPATIENT)
Age: 70
Discharge: HOME | End: 2025-06-24
Payer: MEDICARE

## 2025-06-24 DIAGNOSIS — M25.551 RIGHT HIP PAIN: ICD-10-CM

## 2025-06-24 PROCEDURE — 73502 X-RAY EXAM HIP UNI 2-3 VIEWS: CPT

## 2025-06-27 ENCOUNTER — RESULTS FOLLOW-UP (OUTPATIENT)
Dept: FAMILY MEDICINE CLINIC | Age: 70
End: 2025-06-27

## 2025-08-03 SDOH — HEALTH STABILITY: PHYSICAL HEALTH: ON AVERAGE, HOW MANY DAYS PER WEEK DO YOU ENGAGE IN MODERATE TO STRENUOUS EXERCISE (LIKE A BRISK WALK)?: 7 DAYS

## 2025-08-03 SDOH — HEALTH STABILITY: PHYSICAL HEALTH: ON AVERAGE, HOW MANY MINUTES DO YOU ENGAGE IN EXERCISE AT THIS LEVEL?: 50 MIN

## 2025-08-03 ASSESSMENT — PATIENT HEALTH QUESTIONNAIRE - PHQ9
SUM OF ALL RESPONSES TO PHQ QUESTIONS 1-9: 0
SUM OF ALL RESPONSES TO PHQ QUESTIONS 1-9: 0
2. FEELING DOWN, DEPRESSED OR HOPELESS: NOT AT ALL
SUM OF ALL RESPONSES TO PHQ QUESTIONS 1-9: 0
1. LITTLE INTEREST OR PLEASURE IN DOING THINGS: NOT AT ALL
SUM OF ALL RESPONSES TO PHQ QUESTIONS 1-9: 0

## 2025-08-03 ASSESSMENT — LIFESTYLE VARIABLES
HOW MANY STANDARD DRINKS CONTAINING ALCOHOL DO YOU HAVE ON A TYPICAL DAY: 1 OR 2
HOW OFTEN DO YOU HAVE A DRINK CONTAINING ALCOHOL: 4
HOW MANY STANDARD DRINKS CONTAINING ALCOHOL DO YOU HAVE ON A TYPICAL DAY: 1
HOW OFTEN DO YOU HAVE SIX OR MORE DRINKS ON ONE OCCASION: 1
HOW OFTEN DO YOU HAVE A DRINK CONTAINING ALCOHOL: 2-3 TIMES A WEEK

## 2025-08-04 ENCOUNTER — OFFICE VISIT (OUTPATIENT)
Dept: FAMILY MEDICINE CLINIC | Age: 70
End: 2025-08-04

## 2025-08-04 ENCOUNTER — RESULTS FOLLOW-UP (OUTPATIENT)
Dept: FAMILY MEDICINE CLINIC | Age: 70
End: 2025-08-04

## 2025-08-04 VITALS
OXYGEN SATURATION: 98 % | BODY MASS INDEX: 24.95 KG/M2 | WEIGHT: 140.8 LBS | HEIGHT: 63 IN | DIASTOLIC BLOOD PRESSURE: 68 MMHG | SYSTOLIC BLOOD PRESSURE: 122 MMHG | HEART RATE: 62 BPM

## 2025-08-04 DIAGNOSIS — E78.00 HYPERCHOLESTEREMIA: ICD-10-CM

## 2025-08-04 DIAGNOSIS — Z13.1 DIABETES MELLITUS SCREENING: ICD-10-CM

## 2025-08-04 DIAGNOSIS — E78.5 HYPERLIPIDEMIA, UNSPECIFIED HYPERLIPIDEMIA TYPE: ICD-10-CM

## 2025-08-04 DIAGNOSIS — Z00.00 MEDICARE ANNUAL WELLNESS VISIT, SUBSEQUENT: Primary | ICD-10-CM

## 2025-08-04 LAB
ALBUMIN SERPL-MCNC: 4.2 G/DL (ref 3.4–5)
ALBUMIN/GLOB SERPL: 2 {RATIO} (ref 1.1–2.2)
ALP SERPL-CCNC: 61 U/L (ref 40–129)
ALT SERPL-CCNC: 17 U/L (ref 10–40)
ANION GAP SERPL CALCULATED.3IONS-SCNC: 11 MMOL/L (ref 3–16)
AST SERPL-CCNC: 21 U/L (ref 15–37)
BILIRUB SERPL-MCNC: 0.8 MG/DL (ref 0–1)
BUN SERPL-MCNC: 14 MG/DL (ref 7–20)
CALCIUM SERPL-MCNC: 9.4 MG/DL (ref 8.3–10.6)
CHLORIDE SERPL-SCNC: 106 MMOL/L (ref 99–110)
CHOLEST SERPL-MCNC: 149 MG/DL (ref 0–199)
CO2 SERPL-SCNC: 25 MMOL/L (ref 21–32)
CREAT SERPL-MCNC: 0.7 MG/DL (ref 0.6–1.2)
GFR SERPLBLD CREATININE-BSD FMLA CKD-EPI: >90 ML/MIN/{1.73_M2}
GLUCOSE SERPL-MCNC: 81 MG/DL (ref 70–99)
HDLC SERPL-MCNC: 50 MG/DL (ref 40–60)
LDLC SERPL CALC-MCNC: 80 MG/DL
POTASSIUM SERPL-SCNC: 4 MMOL/L (ref 3.5–5.1)
PROT SERPL-MCNC: 6.3 G/DL (ref 6.4–8.2)
SODIUM SERPL-SCNC: 142 MMOL/L (ref 136–145)
TRIGL SERPL-MCNC: 93 MG/DL (ref 0–150)
VLDLC SERPL CALC-MCNC: 19 MG/DL

## 2025-08-04 RX ORDER — ATORVASTATIN CALCIUM 10 MG/1
TABLET, FILM COATED ORAL
Qty: 90 TABLET | Refills: 3 | Status: SHIPPED | OUTPATIENT
Start: 2025-08-04

## 2025-08-04 RX ORDER — ATORVASTATIN CALCIUM 10 MG/1
TABLET, FILM COATED ORAL
Qty: 90 TABLET | Refills: 3 | Status: SHIPPED | OUTPATIENT
Start: 2025-08-04 | End: 2025-08-04 | Stop reason: SDUPTHER